# Patient Record
Sex: MALE | Race: BLACK OR AFRICAN AMERICAN | NOT HISPANIC OR LATINO | Employment: OTHER | URBAN - METROPOLITAN AREA
[De-identification: names, ages, dates, MRNs, and addresses within clinical notes are randomized per-mention and may not be internally consistent; named-entity substitution may affect disease eponyms.]

---

## 2017-05-27 ENCOUNTER — HOSPITAL ENCOUNTER (EMERGENCY)
Facility: HOSPITAL | Age: 27
Discharge: HOME | End: 2017-05-28
Attending: EMERGENCY MEDICINE

## 2017-05-27 VITALS
RESPIRATION RATE: 16 BRPM | TEMPERATURE: 98 F | SYSTOLIC BLOOD PRESSURE: 190 MMHG | OXYGEN SATURATION: 98 % | BODY MASS INDEX: 40.43 KG/M2 | DIASTOLIC BLOOD PRESSURE: 90 MMHG | HEART RATE: 91 BPM | HEIGHT: 74 IN | WEIGHT: 315 LBS

## 2017-05-27 DIAGNOSIS — L02.91 ABSCESS: Primary | ICD-10-CM

## 2017-05-27 DIAGNOSIS — L02.214 ABSCESS OF GROIN: ICD-10-CM

## 2017-05-27 PROCEDURE — 10160 PNXR ASPIR ABSC HMTMA BULLA: CPT

## 2017-05-27 PROCEDURE — 10160 PNXR ASPIR ABSC HMTMA BULLA: CPT | Mod: ,,, | Performed by: EMERGENCY MEDICINE

## 2017-05-27 PROCEDURE — 99283 EMERGENCY DEPT VISIT LOW MDM: CPT | Mod: 25,,, | Performed by: EMERGENCY MEDICINE

## 2017-05-27 PROCEDURE — 99283 EMERGENCY DEPT VISIT LOW MDM: CPT | Mod: 25

## 2017-05-27 RX ORDER — ALBUTEROL SULFATE 0.63 MG/3ML
0.63 SOLUTION RESPIRATORY (INHALATION) EVERY 6 HOURS PRN
COMMUNITY

## 2017-05-27 RX ORDER — CITALOPRAM 10 MG/1
10 TABLET ORAL DAILY
COMMUNITY

## 2017-05-28 PROCEDURE — 25000003 PHARM REV CODE 250: Performed by: EMERGENCY MEDICINE

## 2017-05-28 RX ORDER — LIDOCAINE HYDROCHLORIDE 10 MG/ML
5 INJECTION, SOLUTION EPIDURAL; INFILTRATION; INTRACAUDAL; PERINEURAL
Status: COMPLETED | OUTPATIENT
Start: 2017-05-28 | End: 2017-05-28

## 2017-05-28 RX ADMIN — LIDOCAINE HYDROCHLORIDE 50 MG: 10 INJECTION, SOLUTION EPIDURAL; INFILTRATION; INTRACAUDAL; PERINEURAL at 12:05

## 2017-05-28 NOTE — ED TRIAGE NOTES
"Pt states that he had cyst on his groin for several month. Cyst "popped" a couple of days ago. Pt states that he has been experiencing pain from the area. Pt denies any other symptoms.   "

## 2017-05-28 NOTE — ED PROVIDER NOTES
Encounter Date: 5/27/2017       History     Chief Complaint   Patient presents with    Abscess     To right groin     Review of patient's allergies indicates:  No Known Allergies  27 yo M with hx of HTN, asthma presents with small abscess on his upper groin. It started weeks ago and became bigger then spontaneously ruptured. It has since gotten better but in the last 2 days has become painful again. No fevers, chills, skin changes.           Past Medical History:   Diagnosis Date    Asthma     Hypertension      History reviewed. No pertinent surgical history.  History reviewed. No pertinent family history.  Social History   Substance Use Topics    Smoking status: Current Some Day Smoker     Types: Cigarettes    Smokeless tobacco: Not on file    Alcohol use Yes     Review of Systems   Constitutional: Negative for fever.   HENT: Negative for sore throat.    Respiratory: Negative for shortness of breath.    Cardiovascular: Negative for chest pain.   Gastrointestinal: Negative for nausea.   Genitourinary: Negative for dysuria.   Musculoskeletal: Negative for back pain.   Skin: Negative for rash.   Neurological: Negative for weakness.   Hematological: Does not bruise/bleed easily.       Physical Exam     Initial Vitals   BP Pulse Resp Temp SpO2   05/27/17 2328 05/27/17 2328 05/27/17 2328 05/27/17 2328 05/27/17 2350   (!) 190/90 91 16 98.4 °F (36.9 °C) 98 %     Physical Exam    Constitutional: He appears well-nourished. No distress.   obese   HENT:   Head: Normocephalic.   Eyes: Conjunctivae and EOM are normal.   Cardiovascular: Normal rate, regular rhythm, normal heart sounds and intact distal pulses.   No murmur heard.  Pulmonary/Chest: Breath sounds normal. No respiratory distress.   Abdominal: Soft. He exhibits no distension. There is no tenderness.   Musculoskeletal: Normal range of motion. He exhibits no edema or tenderness.   Neurological: He is alert and oriented to person, place, and time.   Skin: Skin is  "warm and dry. Capillary refill takes less than 2 seconds.        1x1 cm firm mobile mass with no surrounding erythema, edema or pain   Psychiatric: He has a normal mood and affect. His behavior is normal. Judgment and thought content normal.         ED Course   I & D - Incision and Drainage  Date/Time: 2017 2:08 AM  Performed by: ANGELICA VILLEGAS  Authorized by: SINDHU ELLISON   Consent Done: Yes  Consent: Verbal consent obtained.  Risks and benefits: risks, benefits and alternatives were discussed  Consent given by: patient  Patient understanding: patient states understanding of the procedure being performed  Patient identity confirmed:  and name  Time out: Immediately prior to procedure a "time out" was called to verify the correct patient, procedure, equipment, support staff and site/side marked as required.  Type: abscess  Body area: anogenital  Location details: abdomen  Anesthesia: local infiltration    Anesthesia:  Anesthesia: local infiltration  Local Anesthetic: lidocaine 1% with epinephrine   Anesthetic total: 2 mL  Patient sedated: no  Scalpel size: 18 guage needle.  Incision type: single straight  Complexity: simple  Drainage: pus and  serosanguinous  Drainage amount: scant  Wound treatment: drainage  Complications: No  Specimens: No  Implants: No  Patient tolerance: Patient tolerated the procedure well with no immediate complications        Labs Reviewed - No data to display                APC / Resident Notes:   26 y.o. male presents with small abscess  No s/s consistent with aggressive cellulitis. Likely a clogged hair follicle  Pt was counseled on BP. Could partly be from pain and discomfort. He follows with  MD in [place].    I&D performed with relief. Will discharge.   [facility]y  Emergency Medicine, PGY-3  2017, 2:05 AM                            ED Course     Clinical Impression:   The encounter diagnosis was Abscess.          Angelica Villegas MD  Resident  17 0210    "

## 2019-11-07 ENCOUNTER — IP HISTORICAL/CONVERTED ENCOUNTER (OUTPATIENT)
Dept: OTHER | Age: 29
End: 2019-11-07

## 2022-07-15 ENCOUNTER — HOSPITAL ENCOUNTER (EMERGENCY)
Age: 32
Discharge: PSYCHIATRIC OR SUBSTANCE USE FACILITY | End: 2022-07-17
Attending: STUDENT IN AN ORGANIZED HEALTH CARE EDUCATION/TRAINING PROGRAM
Payer: MEDICAID

## 2022-07-15 DIAGNOSIS — R45.851 SUICIDAL IDEATION: Primary | ICD-10-CM

## 2022-07-15 PROCEDURE — 80143 DRUG ASSAY ACETAMINOPHEN: CPT

## 2022-07-15 PROCEDURE — 82077 ASSAY SPEC XCP UR&BREATH IA: CPT

## 2022-07-15 PROCEDURE — 87636 SARSCOV2 & INF A&B AMP PRB: CPT

## 2022-07-15 PROCEDURE — 85025 COMPLETE CBC W/AUTO DIFF WBC: CPT

## 2022-07-15 PROCEDURE — 99285 EMERGENCY DEPT VISIT HI MDM: CPT

## 2022-07-15 PROCEDURE — 36415 COLL VENOUS BLD VENIPUNCTURE: CPT

## 2022-07-15 PROCEDURE — 80053 COMPREHEN METABOLIC PANEL: CPT

## 2022-07-15 PROCEDURE — 80179 DRUG ASSAY SALICYLATE: CPT

## 2022-07-16 LAB
ALBUMIN SERPL-MCNC: 3.2 G/DL (ref 3.5–5)
ALBUMIN/GLOB SERPL: 0.8 {RATIO} (ref 1.1–2.2)
ALP SERPL-CCNC: 67 U/L (ref 45–117)
ALT SERPL-CCNC: 66 U/L (ref 12–78)
AMPHET UR QL SCN: POSITIVE
ANION GAP SERPL CALC-SCNC: 4 MMOL/L (ref 5–15)
APAP SERPL-MCNC: <10 UG/ML (ref 10–30)
APPEARANCE UR: CLEAR
AST SERPL W P-5'-P-CCNC: 33 U/L (ref 15–37)
BACTERIA URNS QL MICRO: NEGATIVE /HPF
BARBITURATES UR QL SCN: NEGATIVE
BASOPHILS # BLD: 0 K/UL (ref 0–0.1)
BASOPHILS NFR BLD: 1 % (ref 0–1)
BENZODIAZ UR QL: NEGATIVE
BILIRUB SERPL-MCNC: 0.3 MG/DL (ref 0.2–1)
BILIRUB UR QL: NEGATIVE
BUN SERPL-MCNC: 15 MG/DL (ref 6–20)
BUN/CREAT SERPL: 16 (ref 12–20)
CA-I BLD-MCNC: 8.5 MG/DL (ref 8.5–10.1)
CANNABINOIDS UR QL SCN: NEGATIVE
CHLORIDE SERPL-SCNC: 104 MMOL/L (ref 97–108)
CO2 SERPL-SCNC: 29 MMOL/L (ref 21–32)
COCAINE UR QL SCN: NEGATIVE
COLOR UR: NORMAL
CREAT SERPL-MCNC: 0.93 MG/DL (ref 0.7–1.3)
DATE LAST DOSE: ABNORMAL
DATE LAST DOSE: ABNORMAL
DIFFERENTIAL METHOD BLD: ABNORMAL
DRUG SCRN COMMENT,DRGCM: ABNORMAL
EOSINOPHIL # BLD: 0.1 K/UL (ref 0–0.4)
EOSINOPHIL NFR BLD: 4 % (ref 0–7)
ERYTHROCYTE [DISTWIDTH] IN BLOOD BY AUTOMATED COUNT: 13.4 % (ref 11.5–14.5)
ETHANOL SERPL-MCNC: <10 MG/DL
FLUAV RNA SPEC QL NAA+PROBE: NOT DETECTED
FLUBV RNA SPEC QL NAA+PROBE: NOT DETECTED
GLOBULIN SER CALC-MCNC: 4 G/DL (ref 2–4)
GLUCOSE SERPL-MCNC: 99 MG/DL (ref 65–100)
GLUCOSE UR STRIP.AUTO-MCNC: NEGATIVE MG/DL
HCT VFR BLD AUTO: 35.4 % (ref 36.6–50.3)
HGB BLD-MCNC: 11.9 G/DL (ref 12.1–17)
HGB UR QL STRIP: NEGATIVE
IMM GRANULOCYTES # BLD AUTO: 0 K/UL (ref 0–0.04)
IMM GRANULOCYTES NFR BLD AUTO: 1 % (ref 0–0.5)
KETONES UR QL STRIP.AUTO: NEGATIVE MG/DL
LEUKOCYTE ESTERASE UR QL STRIP.AUTO: NEGATIVE
LYMPHOCYTES # BLD: 2.2 K/UL (ref 0.8–3.5)
LYMPHOCYTES NFR BLD: 56 % (ref 12–49)
MCH RBC QN AUTO: 28 PG (ref 26–34)
MCHC RBC AUTO-ENTMCNC: 33.6 G/DL (ref 30–36.5)
MCV RBC AUTO: 83.3 FL (ref 80–99)
METHADONE UR QL: NEGATIVE
MONOCYTES # BLD: 0.3 K/UL (ref 0–1)
MONOCYTES NFR BLD: 8 % (ref 5–13)
MUCOUS THREADS URNS QL MICRO: NEGATIVE /LPF
NEUTS SEG # BLD: 1.2 K/UL (ref 1.8–8)
NEUTS SEG NFR BLD: 30 % (ref 32–75)
NITRITE UR QL STRIP.AUTO: NEGATIVE
NRBC # BLD: 0 K/UL (ref 0–0.01)
NRBC BLD-RTO: 0 PER 100 WBC
OPIATES UR QL: NEGATIVE
PCP UR QL: NEGATIVE
PH UR STRIP: 5 [PH] (ref 5–8)
PLATELET # BLD AUTO: 285 K/UL (ref 150–400)
PMV BLD AUTO: 9.5 FL (ref 8.9–12.9)
POTASSIUM SERPL-SCNC: 3.6 MMOL/L (ref 3.5–5.1)
PROT SERPL-MCNC: 7.2 G/DL (ref 6.4–8.2)
PROT UR STRIP-MCNC: NEGATIVE MG/DL
RBC # BLD AUTO: 4.25 M/UL (ref 4.1–5.7)
RBC #/AREA URNS HPF: NORMAL /HPF (ref 0–5)
REPORTED DOSE,DOSE: ABNORMAL UNITS
REPORTED DOSE,DOSE: ABNORMAL UNITS
SALICYLATES SERPL-MCNC: <1.7 MG/DL (ref 2.8–20)
SARS-COV-2, COV2: NOT DETECTED
SODIUM SERPL-SCNC: 137 MMOL/L (ref 136–145)
SP GR UR REFRACTOMETRY: 1.01 (ref 1–1.03)
UROBILINOGEN UR QL STRIP.AUTO: 0.1 EU/DL (ref 0.1–1)
WBC # BLD AUTO: 3.9 K/UL (ref 4.1–11.1)
WBC URNS QL MICRO: NORMAL /HPF (ref 0–4)

## 2022-07-16 PROCEDURE — 74011250637 HC RX REV CODE- 250/637: Performed by: PSYCHIATRY & NEUROLOGY

## 2022-07-16 PROCEDURE — 74011250637 HC RX REV CODE- 250/637: Performed by: STUDENT IN AN ORGANIZED HEALTH CARE EDUCATION/TRAINING PROGRAM

## 2022-07-16 PROCEDURE — 81001 URINALYSIS AUTO W/SCOPE: CPT

## 2022-07-16 PROCEDURE — 80307 DRUG TEST PRSMV CHEM ANLYZR: CPT

## 2022-07-16 RX ORDER — PRAZOSIN HYDROCHLORIDE 1 MG/1
2 CAPSULE ORAL ONCE
Status: COMPLETED | OUTPATIENT
Start: 2022-07-16 | End: 2022-07-16

## 2022-07-16 RX ORDER — QUETIAPINE FUMARATE 100 MG/1
200 TABLET, FILM COATED ORAL
Status: DISCONTINUED | OUTPATIENT
Start: 2022-07-16 | End: 2022-07-17 | Stop reason: HOSPADM

## 2022-07-16 RX ORDER — SERTRALINE HYDROCHLORIDE 25 MG/1
50 TABLET, FILM COATED ORAL DAILY
Status: DISCONTINUED | OUTPATIENT
Start: 2022-07-16 | End: 2022-07-17 | Stop reason: HOSPADM

## 2022-07-16 RX ORDER — QUETIAPINE FUMARATE 25 MG/1
50 TABLET, FILM COATED ORAL DAILY
Status: DISCONTINUED | OUTPATIENT
Start: 2022-07-16 | End: 2022-07-17 | Stop reason: HOSPADM

## 2022-07-16 RX ADMIN — QUETIAPINE FUMARATE 50 MG: 25 TABLET ORAL at 12:50

## 2022-07-16 RX ADMIN — QUETIAPINE FUMARATE 200 MG: 100 TABLET ORAL at 23:41

## 2022-07-16 RX ADMIN — PRAZOSIN HYDROCHLORIDE 2 MG: 1 CAPSULE ORAL at 23:42

## 2022-07-16 RX ADMIN — SERTRALINE 50 MG: 25 TABLET, FILM COATED ORAL at 12:50

## 2022-07-16 NOTE — ED NOTES
Patient resting in position of comfort. No needs expressed at this time.  Vitals within normal limits
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
## 2022-07-16 NOTE — BSMART NOTE
BSMART UPDATE    Amira from [facility] called and reported that pt has been denied at all [facility] [employer]. Bed Search to continue with outside hospitals.

## 2022-07-16 NOTE — ED NOTES
Amira with [facility] advised we needed to place consult to psychiatry as they are having difficulty getting Pt placed. She is hopeful Dr. Linda Young will just admit Pt here. Placed stat consult for Nael.

## 2022-07-16 NOTE — ED NOTES
Bedside and Verbal shift change report given to Moises Olszewski, RN (oncoming nurse) by Grace Barrera RN (offgoing nurse). Report included the following information SBAR, ED Summary, MAR and Recent Results.

## 2022-07-16 NOTE — BSMART NOTE
BSMART Liaison Team Note     LOS:  11:38     Patient goal(s) for today: take medications as prescribed, use coping skills and make requests be known to ED staff in a respectful manner. BSMART Liaison team focus/goals: Provide Support    Progress note: Pt was seen by this writer this morning in ED room 19. He was alert and oriented x4. Speech is normal and demonstrated good eye contact. Range of affect is full and pt was calm. Pt's thought process, content and perception all appeared normal. Continues to have suicidal thinking. Denied HI and AVH at this time. Pt was pleasant and cooperative. He was receptive towards discussion regarding coping skills and reported being respectful to ED staff and nurses. Pt continues to want help for suicidal thinking. Barriers to Discharge: Suicidal thoughts    Outpatient provider(s): None   Insurance info/prescription coverage:  [facility] [place]    Diagnosis: MDD and PTSD    Plan: To provide Support until a bed is located for appropriate treatment. BSMART and BSMART Liaison will continue to assess daily. Follow up Psych Consult placed? Yes  Psychiatrist updated?  Yes - By [facility]      Participating treatment team members: Shanna Kang and Blake Barber, [facility], [address] [place] [address]

## 2022-07-16 NOTE — ED NOTES
Received report & assumed care of pt from previous RN, Oscar tSeele. Pt resting quietly in bed, eyes closed. Respirations regular, unlabored. Pt remains in psych safe room with sitter present.

## 2022-07-16 NOTE — BSMART NOTE
BSMART assessment completed, and suicide risk level noted to be moderate. Physician Dr Kami Adams notified. Concerns not observed. Security/Off- has not been notified.
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
## 2022-07-16 NOTE — ED NOTES
Constant Observer Yes - Name: Duc Acuña   Constant Observer Oriented YES   High risk patients are in line of sight at all times yes   Excess equipment/medical supplies not necessary for thecare of the patient removed yes   All sharp or dangerous objects are removed from room: including but not limited to belts, pens & pencils, needles, medications, cosmetics, lighters, matches, nail files, watches, necklaces, glass objects, razors, razor blades, knives, aerosol sprays, drawstring pants, shoes, cords (telephone, call bells, etc.) cleaning wipes or other cleaning items, aluminum cans, not permanently attached wall décor yes   Telephone/cell phone removed as well as TV remote (batteries can be swallowed) yes   Patient belongings removed and labeled at nurses station yes   yest yes   All plastic bags are removed from the room and replaced with paper trash bags yes   Patient is in paper scrubs or appropriate gown and using hospital socks with rubber soles yes   No metal, hard eating utensils or hard plates are on meal tray yes    yes   If Crucifix is hanging on a nail, remove Crucifix as well as the nail Yes         *If any question above is answered \"No,\" documentation is required.

## 2022-07-17 ENCOUNTER — APPOINTMENT (OUTPATIENT)
Dept: GENERAL RADIOLOGY | Age: 32
DRG: 751 | End: 2022-07-17
Attending: INTERNAL MEDICINE
Payer: MEDICAID

## 2022-07-17 ENCOUNTER — HOSPITAL ENCOUNTER (INPATIENT)
Age: 32
LOS: 11 days | Discharge: HOME | DRG: 751 | End: 2022-07-28
Attending: PSYCHIATRY & NEUROLOGY | Admitting: PSYCHIATRY & NEUROLOGY
Payer: MEDICAID

## 2022-07-17 VITALS
RESPIRATION RATE: 15 BRPM | OXYGEN SATURATION: 99 % | BODY MASS INDEX: 34.8 KG/M2 | HEART RATE: 54 BPM | DIASTOLIC BLOOD PRESSURE: 87 MMHG | SYSTOLIC BLOOD PRESSURE: 131 MMHG | HEIGHT: 74 IN | TEMPERATURE: 98.8 F | WEIGHT: 271.17 LBS

## 2022-07-17 DIAGNOSIS — R45.851 SUICIDAL THOUGHTS: ICD-10-CM

## 2022-07-17 DIAGNOSIS — Z02.89 SELF-REFERRAL: ICD-10-CM

## 2022-07-17 DIAGNOSIS — F41.9 ANXIETY: Primary | ICD-10-CM

## 2022-07-17 DIAGNOSIS — R52 PAIN: ICD-10-CM

## 2022-07-17 DIAGNOSIS — F32.0 CURRENT MILD EPISODE OF MAJOR DEPRESSIVE DISORDER WITHOUT PRIOR EPISODE (HCC): ICD-10-CM

## 2022-07-17 DIAGNOSIS — F43.10 PTSD (POST-TRAUMATIC STRESS DISORDER): ICD-10-CM

## 2022-07-17 PROBLEM — F32.A DEPRESSION: Status: ACTIVE | Noted: 2022-07-17

## 2022-07-17 PROCEDURE — 73552 X-RAY EXAM OF FEMUR 2/>: CPT

## 2022-07-17 PROCEDURE — 74011250637 HC RX REV CODE- 250/637: Performed by: PSYCHIATRY & NEUROLOGY

## 2022-07-17 PROCEDURE — 65220000003 HC RM SEMIPRIVATE PSYCH

## 2022-07-17 RX ORDER — LANOLIN ALCOHOL/MO/W.PET/CERES
1 CREAM (GRAM) TOPICAL
Status: DISCONTINUED | OUTPATIENT
Start: 2022-07-18 | End: 2022-07-28 | Stop reason: HOSPADM

## 2022-07-17 RX ORDER — DIPHENHYDRAMINE HYDROCHLORIDE 50 MG/ML
50 INJECTION, SOLUTION INTRAMUSCULAR; INTRAVENOUS
Status: DISCONTINUED | OUTPATIENT
Start: 2022-07-17 | End: 2022-07-28 | Stop reason: HOSPADM

## 2022-07-17 RX ORDER — QUETIAPINE 150 MG/1
150 TABLET, FILM COATED, EXTENDED RELEASE ORAL
COMMUNITY
End: 2022-07-28

## 2022-07-17 RX ORDER — QUETIAPINE FUMARATE 50 MG/1
50 TABLET, FILM COATED ORAL DAILY
Status: DISCONTINUED | OUTPATIENT
Start: 2022-07-18 | End: 2022-07-28 | Stop reason: HOSPADM

## 2022-07-17 RX ORDER — ADHESIVE BANDAGE
30 BANDAGE TOPICAL DAILY PRN
Status: DISCONTINUED | OUTPATIENT
Start: 2022-07-17 | End: 2022-07-28 | Stop reason: HOSPADM

## 2022-07-17 RX ORDER — TRAZODONE HYDROCHLORIDE 50 MG/1
50 TABLET ORAL
Status: DISCONTINUED | OUTPATIENT
Start: 2022-07-17 | End: 2022-07-28 | Stop reason: HOSPADM

## 2022-07-17 RX ORDER — ESCITALOPRAM OXALATE 10 MG/1
10 TABLET ORAL DAILY
COMMUNITY
End: 2022-07-28

## 2022-07-17 RX ORDER — TRAMADOL HYDROCHLORIDE 50 MG/1
50 TABLET ORAL
Status: ON HOLD | COMMUNITY
End: 2022-07-28 | Stop reason: SDUPTHER

## 2022-07-17 RX ORDER — BENZTROPINE MESYLATE 1 MG/1
1 TABLET ORAL
Status: DISCONTINUED | OUTPATIENT
Start: 2022-07-17 | End: 2022-07-28 | Stop reason: HOSPADM

## 2022-07-17 RX ORDER — OLANZAPINE 2.5 MG/1
5 TABLET ORAL
Status: DISCONTINUED | OUTPATIENT
Start: 2022-07-17 | End: 2022-07-20

## 2022-07-17 RX ORDER — ESCITALOPRAM OXALATE 10 MG/1
10 TABLET ORAL DAILY
Status: DISCONTINUED | OUTPATIENT
Start: 2022-07-18 | End: 2022-07-17

## 2022-07-17 RX ORDER — ACETAMINOPHEN 325 MG/1
650 TABLET ORAL
Status: DISCONTINUED | OUTPATIENT
Start: 2022-07-17 | End: 2022-07-28 | Stop reason: HOSPADM

## 2022-07-17 RX ORDER — TRAMADOL HYDROCHLORIDE 50 MG/1
50 TABLET ORAL
Status: DISCONTINUED | OUTPATIENT
Start: 2022-07-17 | End: 2022-07-28 | Stop reason: HOSPADM

## 2022-07-17 RX ORDER — HYDROXYZINE 50 MG/1
50 TABLET, FILM COATED ORAL
Status: DISCONTINUED | OUTPATIENT
Start: 2022-07-17 | End: 2022-07-28 | Stop reason: HOSPADM

## 2022-07-17 RX ORDER — QUETIAPINE FUMARATE 50 MG/1
50 TABLET, FILM COATED ORAL DAILY
COMMUNITY
End: 2022-07-28

## 2022-07-17 RX ORDER — PRAZOSIN HYDROCHLORIDE 1 MG/1
1 CAPSULE ORAL
COMMUNITY
End: 2022-07-28

## 2022-07-17 RX ORDER — LANOLIN ALCOHOL/MO/W.PET/CERES
325 CREAM (GRAM) TOPICAL
COMMUNITY

## 2022-07-17 RX ORDER — PRAZOSIN HYDROCHLORIDE 1 MG/1
1 CAPSULE ORAL
Status: DISCONTINUED | OUTPATIENT
Start: 2022-07-17 | End: 2022-07-28 | Stop reason: HOSPADM

## 2022-07-17 RX ADMIN — TRAMADOL HYDROCHLORIDE 50 MG: 50 TABLET, COATED ORAL at 15:26

## 2022-07-17 RX ADMIN — SERTRALINE 50 MG: 25 TABLET, FILM COATED ORAL at 10:06

## 2022-07-17 RX ADMIN — PRAZOSIN HYDROCHLORIDE 1 MG: 1 CAPSULE ORAL at 20:17

## 2022-07-17 RX ADMIN — QUETIAPINE FUMARATE 50 MG: 25 TABLET ORAL at 10:06

## 2022-07-17 RX ADMIN — QUETIAPINE FUMARATE 150 MG: 100 TABLET ORAL at 20:16

## 2022-07-17 RX ADMIN — TRAMADOL HYDROCHLORIDE 50 MG: 50 TABLET, COATED ORAL at 20:29

## 2022-07-17 NOTE — ED NOTES
Report given to Danelle Calderon RN at [facility]. Report given to [facility] for transportation. Belongings given back to patient, escorted to [facility]  bus by wheelchair.

## 2022-07-17 NOTE — BH NOTES
Pt. Complaining of moderate pain in right inner thigh area, PRN tramadol administered per request.  Pain stated number was 6.

## 2022-07-17 NOTE — ED NOTES
Assumed care of patient at this time. Pt resting on stretcher, in no acute distress. Walker at bedside. 1:1 sitter at bedside and able to directly visualize patient. [id number]: pt given breakfast tray. 1:1 sitter still at bedside.
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
## 2022-07-17 NOTE — ED NOTES
Report given to Hugo moore RN. Pending bed search at this time. Pt is resting comfortably in no acute distress. Sitter at bedside. Transferring care of patient at this time.
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
## 2022-07-17 NOTE — BSMART NOTE
ADULT VOLUNTARY BED SEARCH STARTED/RESUMED AT 7/17/2022:     [facility] ([place] [address] [address], [facility]): contacted at 0800 spoke with Amira* reported reviewing    1100AM pt accepted to [facility], see notes for details     [facility]: contacted at 26 spoke with Hilda Zhang* reported fax [phone] declined, inappropriate      [facility] ([facility] [address]  [facility]): contacted at 1524 spoke with Louise Logan* [facility] contacted at 46 spoke with Mojgan Diallo reported will call this writer back     Tiffanie Lamb: contacted at 65 spoke with Glenna Rodriguez* reported at [address]: contacted at 65 spoke with Michell Franz* reported fax clinicals     [address]: contacted at 46 spoke with Kirsten Garrido* reported at [facility]: contacted at 46 spoke with Sofía Berman* reported at [address]: contacted at 18 spoke with Demetrius Johnson* reported at [address]: contacted at 18, left  VM* reported fax clinicals     [facility] (Crissie Hemp): contacted at [address] [facility] spoke with Monica Ba* reported at [address]: contacted at [facility] spoke with Lindsey Lara* reported at [address]: contacted at 0806, left VM     [address]: contacted at [address] [facility] spoke with Adelaida Tyler* reported at [address]: contacted at [address] [facility] spoke with Massiel Pierce* reported at [address] ([address]): contacted at [phone] spoke with Maggie Forrester reported fax [phone] declined due to past/current legal charges        Caprice Mccarthy RN

## 2022-07-17 NOTE — ED NOTES
Patient stable. No acute complaints. Evaluation was normal, the patient is resting comfortably. Vital signs are normal.  Pending bed search.

## 2022-07-18 LAB
EST. AVERAGE GLUCOSE BLD GHB EST-MCNC: 88 MG/DL
HBA1C MFR BLD: 4.7 % (ref 4–5.6)

## 2022-07-18 PROCEDURE — 36415 COLL VENOUS BLD VENIPUNCTURE: CPT

## 2022-07-18 PROCEDURE — 65220000003 HC RM SEMIPRIVATE PSYCH

## 2022-07-18 PROCEDURE — 74011250637 HC RX REV CODE- 250/637: Performed by: PSYCHIATRY & NEUROLOGY

## 2022-07-18 PROCEDURE — 83036 HEMOGLOBIN GLYCOSYLATED A1C: CPT

## 2022-07-18 RX ORDER — SERTRALINE HYDROCHLORIDE 50 MG/1
50 TABLET, FILM COATED ORAL DAILY
Status: DISCONTINUED | OUTPATIENT
Start: 2022-07-18 | End: 2022-07-28 | Stop reason: HOSPADM

## 2022-07-18 RX ADMIN — TRAZODONE HYDROCHLORIDE 50 MG: 50 TABLET ORAL at 21:49

## 2022-07-18 RX ADMIN — ACETAMINOPHEN 650 MG: 325 TABLET ORAL at 16:48

## 2022-07-18 RX ADMIN — SERTRALINE HYDROCHLORIDE 50 MG: 50 TABLET ORAL at 15:08

## 2022-07-18 RX ADMIN — QUETIAPINE FUMARATE 50 MG: 50 TABLET ORAL at 08:06

## 2022-07-18 RX ADMIN — QUETIAPINE FUMARATE 150 MG: 100 TABLET ORAL at 21:49

## 2022-07-18 RX ADMIN — TRAMADOL HYDROCHLORIDE 50 MG: 50 TABLET, COATED ORAL at 08:06

## 2022-07-18 RX ADMIN — TRAMADOL HYDROCHLORIDE 50 MG: 50 TABLET, COATED ORAL at 21:50

## 2022-07-18 RX ADMIN — FERROUS SULFATE TAB 325 MG (65 MG ELEMENTAL FE) 325 MG: 325 (65 FE) TAB at 08:07

## 2022-07-18 RX ADMIN — PRAZOSIN HYDROCHLORIDE 1 MG: 1 CAPSULE ORAL at 21:50

## 2022-07-18 NOTE — PROGRESS NOTES
Problem: Falls - Risk of  Goal: *Absence of Falls  Description: Document Ravi Coles Fall Risk and appropriate interventions in the flowsheet.   Outcome: Progressing Towards Goal  Note: Fall Risk Interventions:  Mobility Interventions: Utilize walker, cane, or other assistive device         Medication Interventions: Teach patient to arise slowly
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
## 2022-07-18 NOTE — GROUP NOTE
MICHAEL  GROUP DOCUMENTATION INDIVIDUAL                                                                          Group Therapy Note    Date: 7/18/2022    Group Start Time: 1340  Group End Time: 1500  Group Topic: Process Group - Inpatient    [facility] Renae Murray    IP [address] [facility] GROUP DOCUMENTATION GROUP    Group Therapy Note    Attendees: 2/3    Writer facilitated a combination psych-education/process group. Writer provided education along with a worksheet titled \"Building New Habits. \" Writer encouraged patients to provide input and feedback about the worksheet. Writer discussed various aspects of building new habits and encouraged patients to process ways they could incorporate these changes into their lives. Writer then facilitated a processing group. Writer encouraged patients to process any feelings they may be having and to discuss discharge plans. Writer provided patients with a mindfulness expressive arts exercise involving inder while they were processing. Writer encouraged patients to provide feedback regarding the group. Attendance: Did not attend      Additional Notes:  Pt was invited and encouraged to attend group but chose not to attend.      [facility]

## 2022-07-18 NOTE — BH NOTES
Pt has spent a couple hours this afternoon in the dayroom, watching a movie. No s/s of distress noted. Pt came to the nurses station asking for some tylenol for pain. Pt is ambulating with the walker w/o difficulties. When patient was brought his prn tylenol, he asked to change his dressing again this evening. Dressing currently has sanguinous drainage visible.   Pt requested to eat first.

## 2022-07-18 NOTE — BH NOTES
Pt slept overnight ,    Pt sleeping at this time breathing spontaneously and changing positions while asleep. Lori Christine  No violent no self harming behaviors noticed  or reported

## 2022-07-19 PROCEDURE — 74011250637 HC RX REV CODE- 250/637: Performed by: PSYCHIATRY & NEUROLOGY

## 2022-07-19 PROCEDURE — 65220000003 HC RM SEMIPRIVATE PSYCH

## 2022-07-19 RX ADMIN — QUETIAPINE FUMARATE 150 MG: 100 TABLET ORAL at 20:41

## 2022-07-19 RX ADMIN — SERTRALINE HYDROCHLORIDE 50 MG: 50 TABLET ORAL at 08:24

## 2022-07-19 RX ADMIN — FERROUS SULFATE TAB 325 MG (65 MG ELEMENTAL FE) 325 MG: 325 (65 FE) TAB at 08:24

## 2022-07-19 RX ADMIN — TRAZODONE HYDROCHLORIDE 50 MG: 50 TABLET ORAL at 20:42

## 2022-07-19 RX ADMIN — TRAMADOL HYDROCHLORIDE 50 MG: 50 TABLET, COATED ORAL at 17:23

## 2022-07-19 RX ADMIN — QUETIAPINE FUMARATE 50 MG: 50 TABLET ORAL at 08:24

## 2022-07-19 RX ADMIN — PRAZOSIN HYDROCHLORIDE 1 MG: 1 CAPSULE ORAL at 20:41

## 2022-07-19 RX ADMIN — TRAMADOL HYDROCHLORIDE 50 MG: 50 TABLET, COATED ORAL at 12:16

## 2022-07-19 NOTE — BH NOTES
PSA PART II ADDITIONAL INFORMATION        Access To Fire Arms: No    Substance Use: YES    Last Use: Pt states \"few months ago\" However, labs were positive for amphetamines     Type of Substance: Other amphetamines     Frequency of Use: Pt reports 'not frequently. \"    Request to See : NO    If yes, notified : NO    Guardian:NO    Guardian Contact: N/A    Release of Information Signed: YES    Release of Information Signed For: Samaria Licea, [phone]    Patient signed treatment plan. Collateral: Writer called and left a message for Samaria Licea.

## 2022-07-19 NOTE — PROGRESS NOTES
Pt is progressing toward his goals. Denies suicidal and homicidal thoughts, says his depression and anxiety is better. Attending and participating in groups, compliant with meds. Pleasant and cooperative with staff and peers. Safe on unit.
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
## 2022-07-19 NOTE — PROGRESS NOTES
Problem: Depressed Mood (Adult/Pediatric)  Goal: *STG: Participates in treatment plan  Outcome: Progressing Towards Goal  Goal: *STG: Attends activities and groups  Outcome: Progressing Towards Goal  Goal: *LTG: Returns to previous level of functioning and participates with after care plan  Outcome: Progressing Towards Goal  Note: Pt engaged in PSA.  Pt stated that he is open to inpatient substance use program.
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
## 2022-07-19 NOTE — PROGRESS NOTES
Problem: Anxiety  Goal: *Alleviation of anxiety  7/18/2022 2242 by Malena Amaral RN  Outcome: Progressing Towards Goal  7/18/2022 2239 by Malena Amaral RN  Outcome: Progressing Towards Goal     Problem: Depressed Mood (Adult/Pediatric)  Goal: *STG: Participates in treatment plan  Outcome: Progressing Towards Goal  Goal: *STG: Attends activities and groups  Outcome: Progressing Towards Goal  Goal: *STG: Remains safe in hospital  Outcome: Progressing Towards Goal  Goal: *STG: Complies with medication therapy  Outcome: Progressing Towards Goal     Problem: Suicide  Goal: *STG: Remains safe in hospital  Outcome: Progressing Towards Goal

## 2022-07-19 NOTE — GROUP NOTE
MICHAEL  GROUP DOCUMENTATION INDIVIDUAL                                                                          Group Therapy Note    Date: 7/19/2022    Group Start Time: 1330  Group End Time: 1500  Group Topic: Process Group - Inpatient    [facility] Renae Murray    IP [address] [facility] GROUP DOCUMENTATION GROUP    Group Therapy Note    Attendees: 3/3    Writer facilitated a combination reflection/relaxation and process group. Writer encouraged patients to process any feelings they may be having and encouraged them to discuss coping skills and discharge plans. Writer reviewed with patients various concepts such as \"toxic positivity\" and encouraged them to reflect. Writer had patients engage in an expressive arts therapeutic activity involving paint for the reflection/relaxation piece. Writer concluded group with a grounding exercise and encouraged patients to provide input and feedback regarding the group. Attendance: Attended    Patient's Goal:  To attend and participate in groups and activities daily    Interventions/techniques: Art integration, Informed, Validated, Promoted peer support and Supported    Follows Directions: Followed directions    Interactions: Interacted appropriately    Mental Status: Congruent and Elevated    Behavior/appearance: Attentive, Caretaking, Cooperative, Enthusiastic and Motivated    Goals Achieved: Able to engage in interactions, Able to listen to others, Able to give feedback to another, Able to reflect/comment on own behavior, Able to manage/cope with feelings, Able to self-disclose, Discussed coping, Discussed discharge plans, Displayed empathy and Identified relapse prevention strategies      Additional Notes:  Pt actively participated. Pt was able to engage in the expressive arts activity and reflect on the meaning.  Pt was able to discuss his struggles with anxiety and depression since his injury and stated that he wanted to make positive changes and become a . Pt is making progress by attending and participating in groups and activities daily.      [facility]

## 2022-07-19 NOTE — BH NOTES
Pt. Complained of pain 7/10 in right thigh area. Requested pain medication. PRN tramadol administered.
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
## 2022-07-20 PROCEDURE — 74011250637 HC RX REV CODE- 250/637: Performed by: PSYCHIATRY & NEUROLOGY

## 2022-07-20 PROCEDURE — 74011250637 HC RX REV CODE- 250/637: Performed by: INTERNAL MEDICINE

## 2022-07-20 PROCEDURE — 65220000003 HC RM SEMIPRIVATE PSYCH

## 2022-07-20 RX ORDER — METHOCARBAMOL 500 MG/1
500 TABLET, FILM COATED ORAL 3 TIMES DAILY
Status: DISCONTINUED | OUTPATIENT
Start: 2022-07-20 | End: 2022-07-28 | Stop reason: HOSPADM

## 2022-07-20 RX ORDER — OLANZAPINE 5 MG/1
5 TABLET, ORALLY DISINTEGRATING ORAL
Status: DISCONTINUED | OUTPATIENT
Start: 2022-07-20 | End: 2022-07-24

## 2022-07-20 RX ADMIN — TRAMADOL HYDROCHLORIDE 50 MG: 50 TABLET, COATED ORAL at 13:52

## 2022-07-20 RX ADMIN — FERROUS SULFATE TAB 325 MG (65 MG ELEMENTAL FE) 325 MG: 325 (65 FE) TAB at 08:39

## 2022-07-20 RX ADMIN — TRAMADOL HYDROCHLORIDE 50 MG: 50 TABLET, COATED ORAL at 05:25

## 2022-07-20 RX ADMIN — METHOCARBAMOL 500 MG: 500 TABLET ORAL at 16:19

## 2022-07-20 RX ADMIN — PRAZOSIN HYDROCHLORIDE 1 MG: 1 CAPSULE ORAL at 21:13

## 2022-07-20 RX ADMIN — ACETAMINOPHEN 650 MG: 325 TABLET ORAL at 08:49

## 2022-07-20 RX ADMIN — SERTRALINE HYDROCHLORIDE 50 MG: 50 TABLET ORAL at 08:39

## 2022-07-20 RX ADMIN — QUETIAPINE FUMARATE 50 MG: 50 TABLET ORAL at 08:39

## 2022-07-20 RX ADMIN — METHOCARBAMOL 500 MG: 500 TABLET ORAL at 21:13

## 2022-07-20 RX ADMIN — QUETIAPINE FUMARATE 150 MG: 100 TABLET ORAL at 21:13

## 2022-07-20 NOTE — PROGRESS NOTES
Spiritual Care Assessment/Progress Note  [facility]      NAME: Jazz Guy      MRN: [id number]  AGE: 28 y.o.  SEX: male  Hoahaoism Affiliation: No preference   Language: English     7/20/2022     Total Time (in minutes): 15     Spiritual Assessment begun in [facility] through conversation with:         [x]Patient        [] Family    [] Friend(s)        Reason for Consult: Initial/Spiritual assessment, patient floor     Spiritual beliefs: (Please include comment if needed)     [x] Identifies with a priya tradition:  Mor Riggs       [] Supported by a priya community:            [] Claims no spiritual orientation:           [] Seeking spiritual identity:                [] Adheres to an individual form of spirituality:           [] Not able to assess:                           Identified resources for coping:      [] Prayer                               [] Music                  [] Guided Imagery     [x] Family/friends                 [] Pet visits     [] Devotional reading                         [] Unknown     [] Other:                                               Interventions offered during this visit: (See comments for more details)    Patient Interventions: Affirmation of emotions/emotional suffering, Affirmation of priya, Hoahaoism beliefs/image of God discussed, Iconic (affirming the presence of God/Higher Power)           Plan of Care:     [x] Support spiritual and/or cultural needs    [] Support AMD and/or advance care planning process      [] Support grieving process   [] Coordinate Rites and/or Rituals    [] Coordination with community clergy   [] No spiritual needs identified at this time   [] Detailed Plan of Care below (See Comments)  [] Make referral to Music Therapy  [] Make referral to Pet Therapy     [] Make referral to Addiction services  [] Make referral to [facility]  [] Make referral to Spiritual Care Partner  [] No future visits requested [x] Contact Spiritual Care for further referrals     Comments: Visited with patient while rounding in the [facility] Unit. Patient shared that he is [place] and had a Bible close by that he was reading. Patient has support at home. Patient wasn't feeling that good stating that his legs were hurting.  provided words of affirmation, care and concern, and presence. Advised of  availability. Reviewed chart. Discussed case with staff. Rev.  Gianni Sullivan 68, Cathleen Gustafson

## 2022-07-20 NOTE — BH NOTES
B:  Received awake up in room. Walks with walker with balance stability. Reports resting well during the night. Pt. Alert and oriented x 4. Pt. States depression is a 2. Pt. States anxiety is 2. Pt. denies hallucinations. Denies SI/HI. Cooperative with assessment. Self dressing change to right thigh wound. Cleaned with wound , packed with iodoform,  2x2 sterile gauge applied, Border foam placed on top. Performed procedure well. Small amount of bloody drainage noted to old dressing. I:   Administer medications as ordered and needed, Encourage pt to attend and participate in groups, encourage pt to be up for all meals and snacks, consuming all each time, encourage pt to interact with peers in a positive manner. Q 15 minute safety checks continue. R:   Compliant with medications. does attend groups, does participate. Pt. is getting up for meals, consumes 100% of meals, snacks. Pt. does interact with peers. no safety concerns at this time. P:   Pt. Will develop and continue to utilize positive coping skills. Plan of care will continue as ordered. Will continue to interact with peers and staff appropriately.

## 2022-07-21 PROCEDURE — 74011250637 HC RX REV CODE- 250/637: Performed by: INTERNAL MEDICINE

## 2022-07-21 PROCEDURE — 74011250637 HC RX REV CODE- 250/637: Performed by: PSYCHIATRY & NEUROLOGY

## 2022-07-21 PROCEDURE — 65220000003 HC RM SEMIPRIVATE PSYCH

## 2022-07-21 RX ORDER — ATOMOXETINE 40 MG/1
40 CAPSULE ORAL
Status: DISCONTINUED | OUTPATIENT
Start: 2022-07-22 | End: 2022-07-21

## 2022-07-21 RX ADMIN — TRAMADOL HYDROCHLORIDE 50 MG: 50 TABLET, COATED ORAL at 07:52

## 2022-07-21 RX ADMIN — QUETIAPINE FUMARATE 150 MG: 100 TABLET ORAL at 21:31

## 2022-07-21 RX ADMIN — QUETIAPINE FUMARATE 50 MG: 50 TABLET ORAL at 08:09

## 2022-07-21 RX ADMIN — TRAMADOL HYDROCHLORIDE 50 MG: 50 TABLET, COATED ORAL at 21:46

## 2022-07-21 RX ADMIN — HYDROXYZINE HYDROCHLORIDE 50 MG: 50 TABLET, FILM COATED ORAL at 15:32

## 2022-07-21 RX ADMIN — PRAZOSIN HYDROCHLORIDE 1 MG: 1 CAPSULE ORAL at 21:31

## 2022-07-21 RX ADMIN — METHOCARBAMOL 500 MG: 500 TABLET ORAL at 08:09

## 2022-07-21 RX ADMIN — METHOCARBAMOL 500 MG: 500 TABLET ORAL at 15:32

## 2022-07-21 RX ADMIN — METHOCARBAMOL 500 MG: 500 TABLET ORAL at 21:31

## 2022-07-21 RX ADMIN — HYDROXYZINE HYDROCHLORIDE 50 MG: 50 TABLET, FILM COATED ORAL at 04:20

## 2022-07-21 RX ADMIN — SERTRALINE HYDROCHLORIDE 50 MG: 50 TABLET ORAL at 08:09

## 2022-07-21 RX ADMIN — FERROUS SULFATE TAB 325 MG (65 MG ELEMENTAL FE) 325 MG: 325 (65 FE) TAB at 07:50

## 2022-07-21 NOTE — GROUP NOTE
MICHAEL  GROUP DOCUMENTATION INDIVIDUAL                                                                          Group Therapy Note    Date: 7/21/2022    Group Start Time: 1600  Group End Time: 1700  Group Topic: Reflection/Relaxation    [facility]    Amaya Renae    IP [address] [facility] GROUP DOCUMENTATION GROUP    Group Therapy Note    Attendees: 4/5    Writer facilitated a mindfulness reflection/relaxation group. Writer incorporated music and leisure packet with the use of manadalas as a therapeutic tool. Writer encouraged patients to provide feedback regarding the group. Attendance: Attended    Patient's Goal:  To attend and participate in groups and activities daily     Interventions/techniques: Art integration and Other Reflection/Relaxation    Follows Directions: Followed directions    Interactions: Interacted appropriately    Mental Status: Calm and Congruent    Behavior/appearance: Caretaking and Cooperative    Goals Achieved: Able to listen to others, Able to reflect/comment on own behavior, and Able to experience relief/decrease in symptoms      Additional Notes:  Pt actively participated in reflection/relaxation. Pt is making progress by attending and participating in groups and activities daily.      [facility]

## 2022-07-21 NOTE — GROUP NOTE
MICHAEL  GROUP DOCUMENTATION INDIVIDUAL                                                                          Group Therapy Note    Date: 7/21/2022    Group Start Time: 4919  Group End Time: 1600  Group Topic: Process Group - Inpatient    [facility] Renae Murray    IP 1150 [facility] GROUP DOCUMENTATION GROUP    Group Therapy Note    Attendees: 5/5    Writer facilitated a processing group. Writer encouraged patients to process any feelings they may be having and to discuss discharge plans. Writer processed with patients about emotional regulations skills. Writer concluded session by encouraged patients to engage in feedback regarding the group. Attendance: Attended    Patient's Goal:  To attend and participate in groups and activities daily    Interventions/techniques: Informed, Validated, Promoted peer support, and Supported    Follows Directions: Followed directions    Interactions: Interacted appropriately    Mental Status: Congruent and Manic    Behavior/appearance: Attentive, Caretaking, Cooperative, Motivated, and Needed prompting    Goals Achieved: Able to engage in interactions, Able to listen to others, Able to give feedback to another, Able to manage/cope with feelings, Able to receive feedback, Able to self-disclose, and Discussed discharge plans      Additional Notes:  Pt was elevated at the beginning of group but was able to be re-directed. Pt was able to discuss some feelings regarding discharge plans and discussed feelings related to being denied at one of the treatment centers after assessment. Pt is making progress by attending and participating in groups and activities daily.      Adama Antonio

## 2022-07-21 NOTE — BH NOTES
Collateral: Writer followed up with [employer] [facility]. Admissions team stated that pt was on a wait list and that they would call if an assessment could be completed sooner. Next appointment available is Thursday, July 28th at 1:30 pm.    Collateral: Writer followed up with [facility]. Admissions team stated that pt's paperwork had not been reviewed yet but that pt could complete a pre-screen intake assessment at [phone].

## 2022-07-21 NOTE — PROGRESS NOTES
Problem: Falls - Risk of  Goal: *Absence of Falls  Description: Document Gaudencio Watts Fall Risk and appropriate interventions in the flowsheet. Outcome: Progressing Towards Goal  Note: Fall Risk Interventions:  Mobility Interventions: Utilize walker, cane, or other assistive device         Medication Interventions: Teach patient to arise slowly    Elimination Interventions:  Toilet paper/wipes in reach    History of Falls Interventions: Room close to nurse's station

## 2022-07-21 NOTE — BH NOTES
Pt was denied by [facility] due to having to implement a walker. Writer will follow up with other referrals.

## 2022-07-21 NOTE — PROGRESS NOTES
Patient actively participated in Spirituality Group in the [address] [place] [address]. We discussed coping skills and how each person utilizes their priya as a resource to cope, and discussed utilizing the support they have in the community.  utilized music, lyrics to favorite songs, words of encouragement and affirmation, scripture, and testimony to facilitate the group discussion and enhance group dynamics. Rev.  Jacki Mohs, [address], [address]

## 2022-07-21 NOTE — GROUP NOTE
[facility] GROUP DOCUMENTATION INDIVIDUAL                                                                          Group Therapy Note    Date: 7/21/2022    Group Start Time: 1300  Group End Time: 5500  Group Topic: Nursing    [address] Darcie Ortiz RN    IP [address] [facility] GROUP DOCUMENTATION GROUP    Group Therapy Note    Attendees: 4/5       Attendance: Attended    Patient's Goal:  To learn discharge procedure    Interventions/techniques: Informed    Follows Directions: Followed directions    Interactions: Interacted appropriately    Mental Status: Blunted    Behavior/appearance:  Withdrawn/quiet    Goals Achieved: Able to listen to others      Additional Notes:  Questions asked and answered    Chato Hook RN 55.3

## 2022-07-21 NOTE — BH NOTES
Behavioral Health Treatment Team Note     Patient goal(s) for today: Pt reports \"To get excited. \"  Treatment team focus/goals: medication management, safe discharge planning, attend groups and activities daily    Progress note: Pt was observed lying in bed. Pt was pleasant with this writer on approach. Pt endorsed some anxiety regarding some possible missing court dates due to being in inpatient. Pt continues to state that he would like to do inpatient to address substance use. Pt provided additional contact information if writer cannot  González Bruno. Pt provided information for Gloria Cesar [phone] in case Officer Samm Cuellar does not reach out in a few days. Pt was oriented x4 and denies any current SI, HI, AVH. An inpatient level of care is needed in order to stabilize the pt further on medications and to establish a safe discharge plan. LOS:  4  Expected LOS: 7-10 days    Insurance info/prescription coverage:  [place] [employer]  Date of last family contact:  N/A- Writer left another message for Officer González Bruno today (07/21). Family requesting physician contact today:  no  Discharge plan:  Possible inpatient rehab for substance use  Guns in the home:  no   Outpatient provider(s):   To be coordinated prior to discharge    Participating treatment team members: Jose Lopez, * (assigned SW), Clary Zavala, [facility]

## 2022-07-21 NOTE — PROGRESS NOTES
Discussed case interviewed patient  Less depressed less irritable  Attend some groups  Able to interact with some peers and staff  Remains sad at times and withdrawn  Positive suicidal ideations  Denies acute psychotic symptoms  Eating and sleeping better  Has some physical issues    Mental status exam  Alert oriented cooperative  Speech is goal-directed soft tone  Mood is depressed affect is restricted  Thought process linear and logical  Insight and judgment fair  Positive suicidal ideations    Recommendations  Continue inpatient treatments  Safety issues discussed  Follow-up with psychiatric team again tomorrow
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
## 2022-07-21 NOTE — BH NOTES
Treatment Team Meeting held with patient, Dr. Carlyle Gale, Rn Clinical Coordinator, Diamond Soliman, Charge RN and Candy Ward, . Patient has been very pleasant and cooperative. He has been complaint with his medications regimen and is very social with peers and staff. Patient continues to do his own dressing change with observation of the nurses. Patient has good technique as evidenced by wound not have any signs of infections. Patient is in the process of waiting on placement. He is on the waiting list for [facility] and is in the process of being reviewed by Rebeca. Patient questioning medications for His ADD. Will be started on Strattera 40mg PO every day. Will monitor effectiveness of medication. Patient continues to have issues with pain and is taking Roboxin 500mg PO TID and uses Ultram 50mg PO every 6 hours PRN which is effective. Will continue with current plan of care. Patient feels that he has met his goals and is ready for lower level of care.

## 2022-07-21 NOTE — BH NOTES
Pt in bed asleep at this time. Appears to be in no acute distress. Will continue to monitor for safety q15 minutes per unit protocol.
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
## 2022-07-22 PROCEDURE — 74011250637 HC RX REV CODE- 250/637: Performed by: INTERNAL MEDICINE

## 2022-07-22 PROCEDURE — 74011250637 HC RX REV CODE- 250/637: Performed by: PSYCHIATRY & NEUROLOGY

## 2022-07-22 PROCEDURE — 97161 PT EVAL LOW COMPLEX 20 MIN: CPT

## 2022-07-22 PROCEDURE — 65220000003 HC RM SEMIPRIVATE PSYCH

## 2022-07-22 RX ADMIN — METHOCARBAMOL 500 MG: 500 TABLET ORAL at 15:59

## 2022-07-22 RX ADMIN — SERTRALINE HYDROCHLORIDE 50 MG: 50 TABLET ORAL at 08:15

## 2022-07-22 RX ADMIN — FERROUS SULFATE TAB 325 MG (65 MG ELEMENTAL FE) 325 MG: 325 (65 FE) TAB at 08:15

## 2022-07-22 RX ADMIN — QUETIAPINE FUMARATE 150 MG: 100 TABLET ORAL at 21:01

## 2022-07-22 RX ADMIN — TRAZODONE HYDROCHLORIDE 50 MG: 50 TABLET ORAL at 21:53

## 2022-07-22 RX ADMIN — PRAZOSIN HYDROCHLORIDE 1 MG: 1 CAPSULE ORAL at 21:01

## 2022-07-22 RX ADMIN — ACETAMINOPHEN 650 MG: 325 TABLET ORAL at 07:50

## 2022-07-22 RX ADMIN — QUETIAPINE FUMARATE 50 MG: 50 TABLET ORAL at 08:15

## 2022-07-22 RX ADMIN — METHOCARBAMOL 500 MG: 500 TABLET ORAL at 21:03

## 2022-07-22 RX ADMIN — METHOCARBAMOL 500 MG: 500 TABLET ORAL at 08:15
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
## 2022-07-22 NOTE — BH NOTES
B: Pt. Alert and oriented x 3. Pt. States depression is a 3. Pt. States anxiety is 0. Pt. denies hallucinations. Denies SI/HI. No complaints with assessment. PERRLA. Speech clear. Poor dentition. Bright affect. Small delay in processing. Lungs CTA. Heart RRR. Abdomen softly distended, nontender; bs + all quads. Denies constipation. Per pt, voids yellow urine without pain or difficulty. Ambulatory with a steady gait. Peripheral pulses 2+ and equal.     I:   Administer medications as ordered and needed, Encourage pt to attend and participate in groups, encourage pt to be up for all meals and snacks, consuming all each time, encourage pt to interact with peers in a positive manner. Q 15 minute safety checks continue. R:   Compliant with medications. does attend groups, does participate. Pt. is getting up for meals, consumes 100% of meals, snacks. Pt. does interact with peers. no safety concerns at this time. P:   Pt. Will develop and continue to utilize positive coping skills. Pt will remain calm and participate appropriately with group sessions.
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
## 2022-07-22 NOTE — PROGRESS NOTES
Problem: Depressed Mood (Adult/Pediatric)  Goal: *STG: Participates in treatment plan  7/22/2022 1253 by Solitario Ferrer  Outcome: Progressing Towards Goal  7/22/2022 1252 by Solitario Ferrer  Outcome: Progressing Towards Goal     Problem: Depressed Mood (Adult/Pediatric)  Goal: *STG: Participates in treatment plan  7/22/2022 1253 by Solitario Ferrer  Outcome: Progressing Towards Goal  7/22/2022 1252 by Solitario Ferrer  Outcome: Progressing Towards Goal  Goal: *STG: Participates in 1:1 therapy sessions  Outcome: Progressing Towards Goal  Goal: *STG: Attends activities and groups  7/22/2022 1253 by Solitario Ferrer  Outcome: Progressing Towards Goal  7/22/2022 1252 by Solitario Ferrer  Outcome: Progressing Towards Goal  Goal: *STG: Demonstrates reduction in symptoms and increase in insight into coping skills/future focused  7/22/2022 1253 by Solitario Ferrer  Outcome: Progressing Towards Goal  Note: Patient currently completing assessment with [facility] for inpatient rehab for substance use.   7/22/2022 1252 by Solitario Ferrer  Note: Patient currently completing assessment with [facility] for inpatient to address substance use.

## 2022-07-22 NOTE — GROUP NOTE
MICHAEL  GROUP DOCUMENTATION INDIVIDUAL                                                                          Group Therapy Note    Date: 7/22/2022    Group Start Time: 5356  Group End Time: 1700  Group Topic: Process Group - Inpatient    [facility] [address] GROUP DOCUMENTATION GROUP    Group Therapy Note    Attendees: 6/8    Writer facilitated a processing group. Writer implemented various expressive arts activities to provide education regarding coping skills and grounding exercises. Writer encouraged patients to process any feelings they may be having and encouraged patients to discuss discharge plans, provide feedback and input. Writer concluded session with a grounding exercise and encouraging patients to provide feedback regarding the group and ask any questions they may have. Attendance: Attended    Patient's Goal:  To attend and participate in groups and activities daily. Interventions/techniques: Art integration, Validated, Promoted peer support, and Supported    Follows Directions: Followed directions    Interactions: Interacted appropriately    Mental Status: Calm, Congruent, and Elevated    Behavior/appearance: Attentive, Caretaking, Cooperative, and Motivated    Goals Achieved: Able to engage in interactions, Able to listen to others, Able to give feedback to another, Able to manage/cope with feelings, Able to receive feedback, Able to experience relief/decrease in symptoms, Able to self-disclose, Discussed coping, Discussed discharge plans, and Displayed empathy      Additional Notes:  Pt actively participated in process group. Pt was able to self-disclose regarding his family life and mental health. Pt is making progress by attending and participating in groups and activities daily.      Abrahan Rome

## 2022-07-22 NOTE — GROUP NOTE
MICHAEL  GROUP DOCUMENTATION INDIVIDUAL                                                                          Group Therapy Note    Date: 7/22/2022    Group Start Time: 0899  Group End Time: 8671  Group Topic: Education Group - Inpatient     [facility] [address] [place] [address] GROUP DOCUMENTATION GROUP    Group Therapy Note    Attendees: 6/8    Writer facilitated a psych-education group about anxiety. Writer provided education regarding the symptoms of anxiety and the cycle to include avoidance, short-term relief from anxiety, long term anxiety growth, etc. Writer encouraged patients to ask questions and provide feedback. Writer concluded group with discussing coping skills for anxiety. Attendance: Attended    Patient's Goal:  To attend and participate in groups and activities daily. Interventions/techniques: Other Psych-Ed    Follows Directions: Followed directions    Interactions: Interacted appropriately    Mental Status: Calm, Congruent, and Elevated    Behavior/appearance: Attentive, Caretaking, Cooperative, and Motivated    Goals Achieved: Able to listen to others, Able to give feedback to another, Able to reflect/comment on own behavior, Able to receive feedback, Able to self-disclose, and Discussed coping      Additional Notes:  Pt actively participated. Pt was able to discuss various phases of the anxiety cycle he struggles with such as \"running away from things. \" Pt is making progress by attending and participating in groups and activities daily.      Paty Boykin
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
## 2022-07-22 NOTE — BH NOTES
Behavioral Health Treatment Team Note     Patient goal(s) for today: Pt reports \"Do these assessments. \"  Treatment team focus/goals: medication management, safe discharge planning, attend groups and activities daily    Progress note: Pt was observed lying in bed. Pt stated that he felt better after his nap and stated that he had had \"weird\" dreams last night. Pt was oriented x4 and denies any current SI, HI, AVH. An inpatient level of care is needed in order to stabilize the pt further on medications and to establish a safe discharge plan. LOS:  5  Expected LOS: 7-10 days    Insurance info/prescription coverage:  [place] [employer]  Date of last family contact:  None as of yet. Writer left a message for Officer Allie Pinto 07/21. Family requesting physician contact today:  no  Discharge plan:  Possible inpatient rehab for substance use  Guns in the home:  no   Outpatient provider(s):   To be coordinated prior to discharge    Participating treatment team members: Sloan Skelton, * (assigned SW), Paul Self, [address]

## 2022-07-22 NOTE — BH NOTES
Writer followed up with [facility] in [facility]. Pt will need to complete phone assessment for inpatient rehab. [phone].

## 2022-07-23 PROCEDURE — 74011250637 HC RX REV CODE- 250/637: Performed by: PSYCHIATRY & NEUROLOGY

## 2022-07-23 PROCEDURE — 65220000003 HC RM SEMIPRIVATE PSYCH

## 2022-07-23 PROCEDURE — 74011250637 HC RX REV CODE- 250/637: Performed by: INTERNAL MEDICINE

## 2022-07-23 RX ADMIN — HYDROXYZINE HYDROCHLORIDE 50 MG: 50 TABLET, FILM COATED ORAL at 16:01

## 2022-07-23 RX ADMIN — ACETAMINOPHEN 650 MG: 325 TABLET ORAL at 10:31

## 2022-07-23 RX ADMIN — QUETIAPINE FUMARATE 150 MG: 100 TABLET ORAL at 21:17

## 2022-07-23 RX ADMIN — SERTRALINE HYDROCHLORIDE 50 MG: 50 TABLET ORAL at 08:25

## 2022-07-23 RX ADMIN — METHOCARBAMOL 500 MG: 500 TABLET ORAL at 08:26

## 2022-07-23 RX ADMIN — METHOCARBAMOL 500 MG: 500 TABLET ORAL at 21:17

## 2022-07-23 RX ADMIN — TRAZODONE HYDROCHLORIDE 50 MG: 50 TABLET ORAL at 21:17

## 2022-07-23 RX ADMIN — METHOCARBAMOL 500 MG: 500 TABLET ORAL at 16:01

## 2022-07-23 RX ADMIN — PRAZOSIN HYDROCHLORIDE 1 MG: 1 CAPSULE ORAL at 21:17

## 2022-07-23 RX ADMIN — ACETAMINOPHEN 650 MG: 325 TABLET ORAL at 21:18

## 2022-07-23 RX ADMIN — FERROUS SULFATE TAB 325 MG (65 MG ELEMENTAL FE) 325 MG: 325 (65 FE) TAB at 08:25

## 2022-07-23 RX ADMIN — QUETIAPINE FUMARATE 50 MG: 50 TABLET ORAL at 08:26

## 2022-07-23 NOTE — BH NOTES
B: Pt. Alert and oriented x 3. Pt. States depression is a 0. Pt. States anxiety is 0. Pt. denies hallucinations. Denies SI/HI. No complaints with assessment. PERRLA. Speech clear. Poor dentition. Bright affect. Intrusive regarding peers' situations, has to Delay in processing information. Lungs CTA. Heart RRR. Abdomen softly distended, nontender; bs + all quads. Denies constipation. Per pt, voids yellow urine without pain or difficulty. Ambulatory with a steady gait. Peripheral pulses 2+ and equal.     I:   Administer medications as ordered and needed, Encourage pt to attend and participate in groups, encourage pt to be up for all meals and snacks, consuming all each time, encourage pt to interact with peers in a positive manner. Will continue Q 15 minute safety checks per unit protocol. R:   Compliant with medications. does attend groups, does participate. Pt. is getting up for meals, consumes 100% of meals, snacks. Pt. does interact with peers. no safety concerns at this time. P:   Pt. Will develop and continue to utilize positive coping skills. Pt will remain calm and participate appropriately with group sessions.

## 2022-07-23 NOTE — BH NOTES
Patient is eating dinner in the dining room in no acute distress. Will continue to monitor for safety q15 minutes per unit protocol.

## 2022-07-23 NOTE — GROUP NOTE
IP  GROUP DOCUMENTATION INDIVIDUAL                                                                          Group Therapy Note    Date: 7/23/2022    Group Start Time: 1600  Group End Time: 2631  Group Topic: Nursing    [address] Sindhu Ortiz RN    IP [address]150 [facility] GROUP DOCUMENTATION GROUP    Group Therapy Note    Attendees: 8/8       Attendance: Attended    Patient's Goal:  To learn rules and regulations of the [facility]    Interventions/techniques: Informed    Follows Directions: Did not follow directions    Interactions: Did not interact appropriately    Mental Status: Labile and Manic    Behavior/appearance: Disheveled and Grooming impaired    Goals Achieved: Able to give feedback to another      Additional Notes:  Questions asked and answered.     Rudy Oneal RN
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
## 2022-07-23 NOTE — PROGRESS NOTES
Problem: Lower Extremity Wound Care  Goal: *Non-infected wound: Improvement of existing wound, absence of infection, and maintenance of skin integrity  Outcome: Progressing Towards Goal     Problem: Anxiety  Goal: *Alleviation of anxiety  Outcome: Progressing Towards Goal     Problem: Pain  Goal: *Control of Pain  Outcome: Progressing Towards Goal     Problem: Depressed Mood (Adult/Pediatric)  Goal: *STG: Participates in treatment plan  Outcome: Progressing Towards Goal  Goal: *STG: Attends activities and groups  Outcome: Progressing Towards Goal  Goal: *STG: Remains safe in hospital  Outcome: Progressing Towards Goal  Goal: *STG: Complies with medication therapy  Outcome: Progressing Towards Goal     Problem: Suicide  Goal: *STG: Remains safe in hospital  Outcome: Progressing Towards Goal

## 2022-07-24 PROCEDURE — 74011250637 HC RX REV CODE- 250/637: Performed by: INTERNAL MEDICINE

## 2022-07-24 PROCEDURE — 65220000003 HC RM SEMIPRIVATE PSYCH

## 2022-07-24 PROCEDURE — 74011250637 HC RX REV CODE- 250/637: Performed by: PSYCHIATRY & NEUROLOGY

## 2022-07-24 RX ORDER — OLANZAPINE 10 MG/1
10 TABLET, ORALLY DISINTEGRATING ORAL
Status: DISCONTINUED | OUTPATIENT
Start: 2022-07-24 | End: 2022-07-28 | Stop reason: HOSPADM

## 2022-07-24 RX ADMIN — FERROUS SULFATE TAB 325 MG (65 MG ELEMENTAL FE) 325 MG: 325 (65 FE) TAB at 08:23

## 2022-07-24 RX ADMIN — METHOCARBAMOL 500 MG: 500 TABLET ORAL at 08:23

## 2022-07-24 RX ADMIN — METHOCARBAMOL 500 MG: 500 TABLET ORAL at 16:08

## 2022-07-24 RX ADMIN — PRAZOSIN HYDROCHLORIDE 1 MG: 1 CAPSULE ORAL at 21:10

## 2022-07-24 RX ADMIN — SERTRALINE HYDROCHLORIDE 50 MG: 50 TABLET ORAL at 08:23

## 2022-07-24 RX ADMIN — TRAMADOL HYDROCHLORIDE 50 MG: 50 TABLET, COATED ORAL at 00:27

## 2022-07-24 RX ADMIN — OLANZAPINE 10 MG: 10 TABLET, ORALLY DISINTEGRATING ORAL at 20:04

## 2022-07-24 RX ADMIN — METHOCARBAMOL 500 MG: 500 TABLET ORAL at 21:10

## 2022-07-24 RX ADMIN — QUETIAPINE FUMARATE 50 MG: 50 TABLET ORAL at 08:23

## 2022-07-24 RX ADMIN — QUETIAPINE FUMARATE 150 MG: 100 TABLET ORAL at 21:10

## 2022-07-24 NOTE — BH NOTES
B: Pt alert and oriented x4, Pt cooperative and pleasant during assessment. Pt states depression 7/10 anxiety 10/10, denies SI/HI and AVH. Pt expressed having a \"shitty day\" because he's ready. Pt wound drsg order discontinued and only need to apply a large bandaid. Pt continues to ambulate with a cane or walker. Pt noted very and elated. Trazodone 50mg and Tylenol 650mg given. Noted asking nursing to give him certain pain meds that wasn't ordered, stated he normally uses THC and meth (via snort) to stay up. Pt continues on Q15min safety checks. I: Encourage pt to attend and participate in all groups and wrap up; Administer medication as ordered and needed. Encourage pt be up for all meals and snacks. Encourage pt to interact with staff/peers in a positive manner; Pt continues on [id number] safety checks. R: Pt consumed snack; attended and participated in wrap up. Pt interacted with his peers and staff. Pt given Trazodone 50mg po for insomnia. Pt continues on Q15min safety checks. P: Pt will develop and utilize positive coping skills. Pt will continue on Q15min safety checks. 0027: pt at nursing station requesting Tramadol and ice water. Pain rated at a 7/10 to right knee      0530: Pt slept throughout the night with no issues.

## 2022-07-24 NOTE — PROGRESS NOTES
Problem: Lower Extremity Wound Care  Goal: *Non-infected wound: Improvement of existing wound, absence of infection, and maintenance of skin integrity  Outcome: Progressing Towards Goal     Problem: Depressed Mood (Adult/Pediatric)  Goal: *STG: Participates in treatment plan  Outcome: Progressing Towards Goal  Goal: *STG: Attends activities and groups  Outcome: Progressing Towards Goal  Goal: *STG: Remains safe in hospital  Outcome: Progressing Towards Goal  Goal: *STG: Complies with medication therapy  Outcome: Progressing Towards Goal     Problem: Suicide  Goal: *STG: Remains safe in hospital  Outcome: Progressing Towards Goal

## 2022-07-24 NOTE — PROGRESS NOTES
Discuss case interviewed patient  Taking and tolerating medications well  Positive suicidal ideations  Working on his treatment a little bit better  Discussed in treatment team  No overt aggression  Irritable and easily annoyed at times  Sleep and appetite is fair  Require some as needed medication at times  Reports some anxiety symptoms    Alert and oriented fair eye contact  Speech is goal directed soft tone  Affect is labile and irritable  Positive suicidal ideations  Thought process linear and logical  Does not appear to respond to internal stimuli    Recommendations  Continue inpatient treatment  Medication regimen was reviewed  Follow-up with psychiatry again tomorrow
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
## 2022-07-24 NOTE — PROGRESS NOTES
Pt is progressing toward his goals. Denies suicidal and homicidal thoughts. Pleasant with staff and peers. States his depression and anxiety is better. Says he would like to go to rehab and is looking for housing. Attending groups and working on coping skills. Compliant with meds, safe on unit.

## 2022-07-24 NOTE — PROGRESS NOTES
Discussed case interviewed patient  Taking and tolerating medications well  Reports some irritable anxiety and anger  Also downplays methamphetamine use  Hoping to go to rehab later in the week  Mood is unstable  Affect is labile  On no suicidal ideations  Unable to pledge for safety outside the hospital    Mental status exam  Alert oriented fair eye contact  Speech is goal-directed soft tone  Affect is irritable  Thought process linear and logical  Report suicidality during the course of today  Has limited insight to substance use issues and risks    Recommendations  Continue inpatient treatment  Safety issues discussed  Follow-up with psychiatric team again tomorrow

## 2022-07-24 NOTE — BH NOTES
Pt has been out of room today attended some groups and interacted well with peers. Denies suicidal and homicidal thoughts. Dr Olga Vasquez visited this afternoon and discussed plan of care. Safe on unit will continue to monitor.

## 2022-07-25 PROCEDURE — 65220000003 HC RM SEMIPRIVATE PSYCH

## 2022-07-25 PROCEDURE — 74011250637 HC RX REV CODE- 250/637: Performed by: INTERNAL MEDICINE

## 2022-07-25 PROCEDURE — 74011250637 HC RX REV CODE- 250/637: Performed by: PSYCHIATRY & NEUROLOGY

## 2022-07-25 RX ORDER — ATOMOXETINE 40 MG/1
40 CAPSULE ORAL
Status: DISCONTINUED | OUTPATIENT
Start: 2022-07-25 | End: 2022-07-28 | Stop reason: HOSPADM

## 2022-07-25 RX ADMIN — PRAZOSIN HYDROCHLORIDE 1 MG: 1 CAPSULE ORAL at 21:06

## 2022-07-25 RX ADMIN — QUETIAPINE FUMARATE 150 MG: 100 TABLET ORAL at 21:06

## 2022-07-25 RX ADMIN — ATOMOXETINE 40 MG: 40 CAPSULE ORAL at 10:59

## 2022-07-25 RX ADMIN — FERROUS SULFATE TAB 325 MG (65 MG ELEMENTAL FE) 325 MG: 325 (65 FE) TAB at 08:25

## 2022-07-25 RX ADMIN — QUETIAPINE FUMARATE 50 MG: 50 TABLET ORAL at 08:24

## 2022-07-25 RX ADMIN — METHOCARBAMOL 500 MG: 500 TABLET ORAL at 08:24

## 2022-07-25 RX ADMIN — METHOCARBAMOL 500 MG: 500 TABLET ORAL at 21:07

## 2022-07-25 RX ADMIN — TRAMADOL HYDROCHLORIDE 50 MG: 50 TABLET, COATED ORAL at 15:38

## 2022-07-25 RX ADMIN — SERTRALINE HYDROCHLORIDE 50 MG: 50 TABLET ORAL at 08:24

## 2022-07-25 RX ADMIN — METHOCARBAMOL 500 MG: 500 TABLET ORAL at 15:38

## 2022-07-25 RX ADMIN — OLANZAPINE 10 MG: 10 TABLET, ORALLY DISINTEGRATING ORAL at 21:06
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
## 2022-07-25 NOTE — BH NOTES
Behavioral Health Treatment Team Note     Patient goal(s) for today: \"Keep going with assessments. \"  Treatment team focus/goals: medication management, safe discharge planning, attend groups and activities daily    Progress note: Pt was observed walking the unit. Pt stated that he felt his assessment with [facility] went well and that he was hoping to be accepted. Pt was oriented x4 and denies any current SI, HI, AVH. Pt stated that he felt more hopeful but had some anxiety over some possible missing court dates. An inpatient level of care is needed in order to stabilize the pt further on medications and to establish a safe discharge plan. LOS:  8  Expected LOS: 8-10 days    Insurance info/prescription coverage:  [place] Medicaid  Date of last family contact:  Writer left a message for Officer Sandee Rodriguez 07/21. Pt had assessment with [facility]. Writer will collaborate. Family requesting physician contact today:  no  Discharge plan:  Possible inpatient rehab for substance use  Guns in the home:  no   Outpatient provider(s):   To be coordinated prior to discharge    Participating treatment team members: Randy Campos, * (assigned SW), [facility], [facility]
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
## 2022-07-25 NOTE — BH NOTES
B: Pt is alert and oriented x4. Pt is cooperative with assessments. Pt reports feeling \"good\". Pt states they are waiting for somewhere to go from here. Pt identifies they're doing better mentally since their admission. Pt did not identify personal coping alternatives. No s/s of distress noted. No complaints of pain. I: Assess Pt mood. Document presence of depressive/anxiety symptoms. Assess for Audio/visual hallucinations. Establish trust.  Educate Pt on medications and disease processes. Monitor ADLs, food/fluid consumption and sleep. Encouarge group participation and socialization. Educate Pt on medications, coping alternatives, disease processes, and community resources. Safety checks. R: Pt mood is stable. Pt rates depression 0/10, identifies triggers as none. Pt rates anxiety at 0/10, identifies triggers as none. Pt denies SI. Pt denies HI. Pt denies audio/visual hallucinations, s/s are not observed by staff. Pt is easily irritable when his requests aren't met as he'd like. Pt requested 2xL scrub pants and was provided 3xl d/t being out; Pt also upset that his dressing is now a bandaid, \"that's not absorbant enough\". Pt informed if drainage soaks through, another can be applied. No drainage observed when bandaid applied, cleaned with saline. Pt is not attending groups and is not interacting appropriately with staff/peers. Pt is eating all meals, and is maintaining their personal hygiene. Pt reports sleeping well. Pt is compliant wiith medications. P: Encourage group participation and socialization.

## 2022-07-25 NOTE — BH NOTES
Collateral call: Writer spoke with admissions for [facility]. Pt is able to be assessed today at 1:30.  will call the nurse's station.

## 2022-07-25 NOTE — GROUP NOTE
[facility] GROUP DOCUMENTATION INDIVIDUAL                                                                          Group Therapy Note    Date: 7/25/2022    Group Start Time: 1824  Group End Time: 1700  Group Topic: Process Group - Inpatient    [facility] [address]    IP 1150 [facility] GROUP DOCUMENTATION GROUP    Group Therapy Note    Attendees: 6/8    Writer facilitated a processing group combo psychoeducation group. Writer encouraged patients to engage in mindfulness/expressive arts activity while processing any feelings they may have regarding discharge planning, coping, etc. Writer processed with patients regarding CBT exercises titled \"Negative Thinking Patterns. \" Writer reviewed various concepts such as \"Mind reading, minimizing, blaming, etc.\" Writer encouraged patients to provide examples and process any feelings they may have. Writer concluded group with a grounding exercise and encouraging patients to ask questions regarding the group and provide feedback and support to one another. Attendance: Attended    Patient's Goal:  To attend and participate in groups and activities daily     Interventions/techniques: Informed, Validated, Promoted peer support, and Supported    Follows Directions: Followed directions    Interactions: Interacted appropriately    Mental Status: Congruent and Elevated    Behavior/appearance: Attentive, Caretaking, Cooperative, and Motivated    Goals Achieved: Able to engage in interactions, Able to listen to others, Able to give feedback to another, Able to manage/cope with feelings, Able to experience relief/decrease in symptoms, Discussed coping, and Discussed discharge plans      Additional Notes:  Pt actively participated. Pt was able to discuss discharge plans and discuss his feelings regarding having an assessment for substance use. Pt was able to identify thinking errors. Pt is making progress by attending and participating in groups and activities daily. Jessy Scottr

## 2022-07-25 NOTE — PROGRESS NOTES
Nutrition Assessment     Type and Reason for Visit: Initial, Consult (double portion)    Nutrition Recommendations/Plan:   Continue w/ current diet/schedule snacks   Add side salad to L/D      Nutrition Assessment:  Admitted in [address] [place] [address] for double portion. Patient reported that he is still hungry after meals 2/2 recently started on new meds that increase his appetite. He has a good po intake >75% @ meals/snacks. Explained that he does not qualified for double portion due to good po intakes and BMI of 34. However 2/2 to still hungry at meals, will add side salad to meals for L/D. Patient understood and happy with adding a side salad to meals. No further nutritional concern. Malnutrition Assessment:  Malnutrition Status: No malnutrition       Nutrition Related Findings:  Nourished, Obese.     Current Nutrition Therapies:  ADULT DIET Regular  ADULT ORAL NUTRITION SUPPLEMENT AM Snack, PM Snack, HS Snack; Standard 4 oz      Nutrition Diagnosis:   No nutrition diagnosis at this time   Nutrition Interventions:   Food and/or Nutrient Delivery: Modify current diet  Nutrition Education/Counseling: Education not indicated  Coordination of Nutrition Care: No recommendation at this time  Plan of Care discussed with: Patient, RN      Discharge Planning:    Continue current diet    Ciro Bear  Contact: [phone] or Tanvi Grover

## 2022-07-25 NOTE — PROGRESS NOTES
Problem: Anxiety  Goal: *Alleviation of anxiety  Outcome: Progressing Towards Goal     Problem: Depressed Mood (Adult/Pediatric)  Goal: *STG: Participates in treatment plan  Outcome: Progressing Towards Goal  Goal: *STG: Verbalizes anger, guilt, and other feelings in a constructive manor  Outcome: Not Progressing Towards Goal  Goal: *STG: Attends activities and groups  Outcome: Progressing Towards Goal  Goal: *STG: Remains safe in hospital  Outcome: Progressing Towards Goal  Goal: *STG: Complies with medication therapy  Outcome: Progressing Towards Goal     Problem: Suicide  Goal: *STG: Remains safe in hospital  Outcome: Progressing Towards Goal

## 2022-07-25 NOTE — BH NOTES
Pt upset, yelling in hallway. Demanding to speak with [place] Ill. Demanding a \"optiform\". Pt refusing to calm down. Pt has been offered a bandaid, but continues to request the optiform. Despite being informed the order for optiform has been discontinued. Dr. Ines Fitzgerald notified, suggested to give PO zyprexa. Dr. Ines Fitzgerald informed Pt has refused the PO zyprexa, declined to order IM. Pt has been yelling in hallway for 20 minutes. Dr. Ines Fitzgerald aware.

## 2022-07-25 NOTE — PROGRESS NOTES
Problem: Depressed Mood (Adult/Pediatric)  Goal: *STG: Participates in treatment plan  Outcome: Progressing Towards Goal  Note: Pt  completed assessment for [facility] for inpatient substance use.    Goal: *STG: Participates in 1:1 therapy sessions  Outcome: Progressing Towards Goal  Goal: *STG: Attends activities and groups  Outcome: Progressing Towards Goal

## 2022-07-26 PROCEDURE — 65220000003 HC RM SEMIPRIVATE PSYCH

## 2022-07-26 PROCEDURE — 74011250637 HC RX REV CODE- 250/637: Performed by: INTERNAL MEDICINE

## 2022-07-26 PROCEDURE — 74011250637 HC RX REV CODE- 250/637: Performed by: PSYCHIATRY & NEUROLOGY

## 2022-07-26 RX ADMIN — METHOCARBAMOL 500 MG: 500 TABLET ORAL at 21:00

## 2022-07-26 RX ADMIN — PRAZOSIN HYDROCHLORIDE 1 MG: 1 CAPSULE ORAL at 20:44

## 2022-07-26 RX ADMIN — FERROUS SULFATE TAB 325 MG (65 MG ELEMENTAL FE) 325 MG: 325 (65 FE) TAB at 08:36

## 2022-07-26 RX ADMIN — METHOCARBAMOL 500 MG: 500 TABLET ORAL at 17:05

## 2022-07-26 RX ADMIN — QUETIAPINE FUMARATE 150 MG: 100 TABLET ORAL at 20:44

## 2022-07-26 RX ADMIN — METHOCARBAMOL 500 MG: 500 TABLET ORAL at 08:35

## 2022-07-26 RX ADMIN — OLANZAPINE 10 MG: 10 TABLET, ORALLY DISINTEGRATING ORAL at 20:45

## 2022-07-26 RX ADMIN — SERTRALINE HYDROCHLORIDE 50 MG: 50 TABLET ORAL at 08:35

## 2022-07-26 RX ADMIN — HYDROXYZINE HYDROCHLORIDE 50 MG: 50 TABLET, FILM COATED ORAL at 18:45

## 2022-07-26 RX ADMIN — ATOMOXETINE 40 MG: 40 CAPSULE ORAL at 06:53

## 2022-07-26 RX ADMIN — QUETIAPINE FUMARATE 50 MG: 50 TABLET ORAL at 09:00
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
## 2022-07-26 NOTE — BH NOTES
Pt. Requested to have wound care products to clean and cover wound. Writer observed pt. Removing covering with small amount of sanguineous drainage on bandage and bright red sanguineous drainage draining from wound. Upon palpation of wound on right inner thigh, karyna wound edges rolled, undefined, hypergranulation,with serous drainage upon palpation of edges. Pt. Asked writer to clean wound an assess it. Writer cleansed wound with wound cleanser, irrigated with saline, and assessed for tunneling and undermining with a clean cotton tipped applicator. Area has 0.5cm tunneling at 12 0\"clock, undermining of 0.2 at widest point from 7 to 11. Writer notified charge nurse which had left a message with hospitalist, to come evaluate with no return call this am.  Upon Charge nurse assessing, with pt. Having cared for wound prior to and during admission, pt. Requested since the wound was not closed to please pack with iodoform as prior until the dr. Could come assess. Charge nurse agreed and writer packed wound with 1/2\" iodoform wet with saline to edges of tunneling and undermining. Pt. Denies pain, states he cannot feel anything. Writer covered wound with a pressure 2x2 dry gauze and secured with paper tape until able to be seen by hospitalist.  Pt. In agreement.
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
## 2022-07-26 NOTE — PROGRESS NOTES
Problem: Falls - Risk of  Goal: *Absence of Falls  Description: Document Bibi Louis Fall Risk and appropriate interventions in the flowsheet. Outcome: Progressing Towards Goal  Note: Fall Risk Interventions:  Mobility Interventions: Utilize walker, cane, or other assistive device    Mentation Interventions: Room close to nurse's station, Door open when patient unattended    Medication Interventions: Teach patient to arise slowly    Elimination Interventions:  Toilet paper/wipes in reach    History of Falls Interventions: Door open when patient unattended

## 2022-07-26 NOTE — BH NOTES
Spoke with dietary to request a side salad that patient is supposed to receive with every lunch and every dinner. Notified dietary that patient is awaiting a side salad.
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
## 2022-07-26 NOTE — BH NOTES
Writer received call from Dr. Frankey Minors, hospitalist at approx. 1045, writer informed him of pt's current order, assessment from this am, pt. Request of packing again, and had been completed with Charge nurse and writer. Dr. Frankey Minors stated, to order dressing change as upon admission, and he will come evaluate pt.'s wound today. Pt. Made aware.

## 2022-07-26 NOTE — PROGRESS NOTES
Pt is progressing toward his goals. Denies suicidal and homicidal thoughts. Demanding of staff, states he wants to be discharged and sent to rehab. Will not except redirection and is loud agitated with staff most of the time. Compliant with meds, eating and sleeping well. Safe on unit.

## 2022-07-26 NOTE — GROUP NOTE
MICHAEL  GROUP DOCUMENTATION INDIVIDUAL                                                                          Group Therapy Note    Date: 7/26/2022    Group Start Time: 7795  Group End Time: 1500  Group Topic: Process Group - Inpatient    SVR Renae Murray    IP [address] GROUP DOCUMENTATION GROUP    Group Therapy Note    Attendees: 2/7    Writer facilitated a combination process and reflection/relaxation group. Writer encouraged patients to process any feelings they may be having and to discuss discharge plans. Writer processed with patients regarding follow up services and self-care. Writer implemented a reflection/relaxation activity implementing paint and expressive arts therapy. Writer encouraged patients to engage in the mindfulness activity. Writer concluded session with a grounding exercise and encouraging patients to provide input and feedback regarding the group. Attendance: Did not attend      Additional Notes:  Pt was invited and encouraged to attend group but chose not to attend.      Reece Hensley

## 2022-07-26 NOTE — BH NOTES
B: Pt. Alert and oriented x 3. Pt. States depression is a 4. Pt. States anxiety is 4. Pt. denies hallucinations. Denies SI/HI. No complaints with assessment. PERRLA. Speech clear. Irritable. Lungs CTA, heart RRR. Abdomen softly distended, nontender; bs + all quads. Denies constipation. Per pt, voids yellow urine without pain or difficulty. Ambulatory with a steady gait. BUE pulses 2+ and equal.  BLE pulses 1+ and equal bilaterally. I:   Administer medications as ordered and needed, Encourage pt to attend and participate in groups, encourage pt to be up for all meals and snacks, consuming all each time, encourage pt to interact with peers in a positive manner. Q 15 minute safety checks continue. R:   Compliant with medications. does attend groups, does participate. Pt. is getting up for meals, consumes 75% of meals, snacks. Pt. does interact with peers. no safety concerns at this time. P:   Pt. Will develop and continue to utilize positive coping skills. Will attend and participate in group sessions.

## 2022-07-26 NOTE — BH NOTES
B: Pt is out and about on unit this evening. Denies suicidal and homicidal thoughts. States he is ready for discharge and wants to be sent to rehab. Demanding and loud with staff and difficult to redirect. Constantly comes to nurses station asking questions and wanting items. Compliant with meds, eating and sleeping well. Attending and participating in groups. Safe on unit. I: Maintain a safe environment for pt, safety cks q 15 mins and prn. Give meds as ordered, encourage groups. R: Pt is cooperative with assessment, although he made clear to staff that he did not want to be here. Denies suicidal and homicidal thoughts. Encouraged to use his coping skills to help with anger issues. Safe on unit. P: Continue to monitor, give meds as ordered, encourage groups.

## 2022-07-27 PROCEDURE — 65220000003 HC RM SEMIPRIVATE PSYCH

## 2022-07-27 PROCEDURE — 74011250637 HC RX REV CODE- 250/637: Performed by: INTERNAL MEDICINE

## 2022-07-27 PROCEDURE — 74011250637 HC RX REV CODE- 250/637: Performed by: PSYCHIATRY & NEUROLOGY

## 2022-07-27 RX ADMIN — HYDROXYZINE HYDROCHLORIDE 50 MG: 50 TABLET, FILM COATED ORAL at 18:34

## 2022-07-27 RX ADMIN — METHOCARBAMOL 500 MG: 500 TABLET ORAL at 16:49

## 2022-07-27 RX ADMIN — PRAZOSIN HYDROCHLORIDE 1 MG: 1 CAPSULE ORAL at 21:08

## 2022-07-27 RX ADMIN — QUETIAPINE FUMARATE 50 MG: 50 TABLET ORAL at 08:32

## 2022-07-27 RX ADMIN — FERROUS SULFATE TAB 325 MG (65 MG ELEMENTAL FE) 325 MG: 325 (65 FE) TAB at 08:32

## 2022-07-27 RX ADMIN — METHOCARBAMOL 500 MG: 500 TABLET ORAL at 08:32

## 2022-07-27 RX ADMIN — METHOCARBAMOL 500 MG: 500 TABLET ORAL at 21:09

## 2022-07-27 RX ADMIN — ACETAMINOPHEN 650 MG: 325 TABLET ORAL at 18:34

## 2022-07-27 RX ADMIN — QUETIAPINE FUMARATE 150 MG: 100 TABLET ORAL at 21:08

## 2022-07-27 RX ADMIN — TRAMADOL HYDROCHLORIDE 50 MG: 50 TABLET, COATED ORAL at 21:09

## 2022-07-27 RX ADMIN — ATOMOXETINE 40 MG: 40 CAPSULE ORAL at 06:42

## 2022-07-27 RX ADMIN — OLANZAPINE 10 MG: 10 TABLET, ORALLY DISINTEGRATING ORAL at 08:32

## 2022-07-27 RX ADMIN — SERTRALINE HYDROCHLORIDE 50 MG: 50 TABLET ORAL at 08:32
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
## 2022-07-27 NOTE — BH NOTES
B: Pt. Alert and oriented x 3. Pt. States depression is a 0. Pt. States anxiety is 0. Pt. denies hallucinations. Denies SI/HI. No complaints with assessment. PERRLA. Speech clear. Irritable, labile. Lungs CTA, heart RRR. Abdomen softly distended, nontender; bs + all quads. Denies constipation. Per pt, voids yellow urine without pain or difficulty. Ambulatory with a steady gait. BUE pulses 2+ and equal.  BLE pulses 1+ and equal bilaterally. I:   Administer medications as ordered and needed, Encourage pt to attend and participate in groups, encourage pt to be up for all meals and snacks, consuming all each time, encourage pt to interact with peers in a positive manner. Will continue Q15 minute safety checks. R:   Compliant with medications. does attend groups, does participate. Pt. is getting up for meals, consumes 100% of meals, snacks. Pt. does interact with peers. no safety concerns at this time. P:   Pt. Will develop and continue to utilize positive coping skills. Will attend and participate in group sessions. Will interact socially with respect.

## 2022-07-27 NOTE — BH NOTES
Arrangements made for patient to be discharged to [facility] at [address] [address]. Ride Confirmation # [id number] scheduled to  patient at 0800 on 7/28/22. If there are any issues with his transportation, Medicaid [place] can be reached at [phone].

## 2022-07-27 NOTE — PROGRESS NOTES
Patient actively participated in Spirituality Group in the [address] [place] [address] unit.  utilized music, lyrics to favorite songs, words of encouragement, affirmation, scripture, and music to facilitate the group discussion and enhance group dynamics. Rev.  Gianni Post 49, [address]

## 2022-07-28 VITALS
HEIGHT: 74 IN | SYSTOLIC BLOOD PRESSURE: 109 MMHG | HEART RATE: 53 BPM | WEIGHT: 270 LBS | OXYGEN SATURATION: 99 % | RESPIRATION RATE: 18 BRPM | BODY MASS INDEX: 34.65 KG/M2 | DIASTOLIC BLOOD PRESSURE: 58 MMHG | TEMPERATURE: 98.3 F

## 2022-07-28 PROBLEM — R52 PAIN: Status: ACTIVE | Noted: 2022-07-28

## 2022-07-28 PROCEDURE — 74011250637 HC RX REV CODE- 250/637: Performed by: INTERNAL MEDICINE

## 2022-07-28 PROCEDURE — 74011250637 HC RX REV CODE- 250/637: Performed by: PSYCHIATRY & NEUROLOGY

## 2022-07-28 RX ORDER — PRAZOSIN HYDROCHLORIDE 1 MG/1
1 CAPSULE ORAL
Qty: 30 CAPSULE | Refills: 1 | Status: SHIPPED | OUTPATIENT
Start: 2022-07-28 | End: 2022-08-27

## 2022-07-28 RX ORDER — METHOCARBAMOL 500 MG/1
500 TABLET, FILM COATED ORAL 3 TIMES DAILY
Qty: 45 TABLET | Refills: 0 | Status: SHIPPED | OUTPATIENT
Start: 2022-07-28 | End: 2022-08-12

## 2022-07-28 RX ORDER — TRAMADOL HYDROCHLORIDE 50 MG/1
50 TABLET ORAL
Qty: 20 TABLET | Refills: 0 | Status: SHIPPED | OUTPATIENT
Start: 2022-07-28 | End: 2022-08-04

## 2022-07-28 RX ORDER — LANOLIN ALCOHOL/MO/W.PET/CERES
325 CREAM (GRAM) TOPICAL
Qty: 30 TABLET | Refills: 0 | Status: SHIPPED | OUTPATIENT
Start: 2022-07-28 | End: 2022-08-27

## 2022-07-28 RX ORDER — QUETIAPINE FUMARATE 50 MG/1
150 TABLET, FILM COATED ORAL
Qty: 90 TABLET | Refills: 1 | Status: SHIPPED | OUTPATIENT
Start: 2022-07-28 | End: 2022-08-27

## 2022-07-28 RX ORDER — TRAZODONE HYDROCHLORIDE 50 MG/1
50 TABLET ORAL
Qty: 30 TABLET | Refills: 0 | Status: SHIPPED | OUTPATIENT
Start: 2022-07-28 | End: 2022-08-27

## 2022-07-28 RX ORDER — QUETIAPINE FUMARATE 50 MG/1
50 TABLET, FILM COATED ORAL DAILY
Qty: 30 TABLET | Refills: 1 | Status: SHIPPED | OUTPATIENT
Start: 2022-07-28 | End: 2022-08-27

## 2022-07-28 RX ORDER — ACETAMINOPHEN 325 MG/1
650 TABLET ORAL
Qty: 30 TABLET | Refills: 0 | Status: SHIPPED | OUTPATIENT
Start: 2022-07-28 | End: 2022-08-12

## 2022-07-28 RX ORDER — SERTRALINE HYDROCHLORIDE 50 MG/1
50 TABLET, FILM COATED ORAL DAILY
Qty: 30 TABLET | Refills: 1 | Status: SHIPPED | OUTPATIENT
Start: 2022-07-28 | End: 2022-08-27

## 2022-07-28 RX ADMIN — HYDROXYZINE HYDROCHLORIDE 50 MG: 50 TABLET, FILM COATED ORAL at 09:24

## 2022-07-28 RX ADMIN — ATOMOXETINE 40 MG: 40 CAPSULE ORAL at 07:20

## 2022-07-28 RX ADMIN — ACETAMINOPHEN 650 MG: 325 TABLET ORAL at 05:53

## 2022-07-28 RX ADMIN — QUETIAPINE FUMARATE 50 MG: 50 TABLET ORAL at 07:59

## 2022-07-28 RX ADMIN — FERROUS SULFATE TAB 325 MG (65 MG ELEMENTAL FE) 325 MG: 325 (65 FE) TAB at 07:55

## 2022-07-28 RX ADMIN — METHOCARBAMOL 500 MG: 500 TABLET ORAL at 08:00

## 2022-07-28 RX ADMIN — SERTRALINE HYDROCHLORIDE 50 MG: 50 TABLET ORAL at 08:00

## 2022-07-28 NOTE — BH NOTES
DISCHARGE SUMMARY    NAME:Steffany Holloway  : 1990  MRN: [id number]    The patient Doron Castro exhibits the ability to control behavior in a less restrictive environment. Patient's level of functioning is improving. No assaultive/destructive behavior has been observed for the past 24 hours. No suicidal/homicidal threat or behavior has been observed for the past 24 hours. There is no evidence of serious medication side effects. Patient has not been in physical or protective restraints for at least the past 24 hours. If weapons involved, how are they secured? N/A    Is patient aware of and in agreement with discharge plan? Yes    Arrangements for medication:  Prescriptions given to patient, given a weeks supply or 30 day supply. Copy of discharge instructions to provider?:  Yes    Arrangements for transportation home:  Pt will be picked up via Medicaid cab to [facility] crisis while waiting [address] [facility]. Keep all follow up appointments as scheduled, continue to take prescribed medications per physician instructions.   Mental health crisis number:  852 or your local mental health crisis line number at [address] [facility] WARM LINE      9-171-458-MHAV ()      M-F: 9am to 9pm      Sat & Sun: 5pm - 9pm  National suicide prevention lines:                             3-998-GMDWGXX ([phone])       8-272-789-TALK ([phone])    Crisis Text Line:  Text HOME to 695153

## 2022-07-28 NOTE — BH NOTES
Discharge instructions given and explained. States understanding. Signed D/C paperwork. Belongings returned and signed for. Instructed prescriptions can be picked up at [facility] in [place] on [address]. Denies SI/HI. Non Smoker, No education needed. No safety concerns at this time. Transported to [address] [place] [address] via [facility] transport. Pt has walker with him.

## 2022-07-28 NOTE — PROGRESS NOTES
Problem: Falls - Risk of  Goal: *Absence of Falls  Description: Document Gerry Anderson Fall Risk and appropriate interventions in the flowsheet. Outcome: Progressing Towards Goal  Note: Fall Risk Interventions:  Mobility Interventions: Utilize walker, cane, or other assistive device    Mentation Interventions: Room close to nurse's station, Door open when patient unattended    Medication Interventions: Teach patient to arise slowly    Elimination Interventions:  Toilet paper/wipes in reach    History of Falls Interventions: Door open when patient unattended, Room close to nurse's station         Problem: Lower Extremity Wound Care  Goal: *Non-infected wound: Improvement of existing wound, absence of infection, and maintenance of skin integrity  Outcome: Progressing Towards Goal     Problem: Anxiety  Goal: *Alleviation of anxiety  Outcome: Progressing Towards Goal     Problem: Depressed Mood (Adult/Pediatric)  Goal: *STG: Participates in treatment plan  Outcome: Progressing Towards Goal  Goal: *STG: Attends activities and groups  Outcome: Progressing Towards Goal  Goal: *STG: Remains safe in hospital  Outcome: Progressing Towards Goal  Goal: *STG: Complies with medication therapy  Outcome: Progressing Towards Goal     Problem: Suicide  Goal: *STG: Remains safe in hospital  Outcome: Progressing Towards Goal

## 2022-07-28 NOTE — BH NOTES
Pt. Discharged home via taxi in stable cond. Pt. Voiced no thoughts of wanting to harm self or others.

## 2022-07-28 NOTE — BH NOTES
[employer] called to check status of transport. Informed that transport was cancelled due to not being able to clarify destination. Reordered via [place] of transportation. Scheduled to  before 11:26.  Trip #[id number]

## 2022-07-28 NOTE — BH NOTES
B: Pt alert and oriented x4, Pt cooperative and pleasant during assessment. C/O right knee pain 6/10 and Tramadol 50mg given po given. Pt wound drsg to right thigh intact. Pt remains intrusive and loud. Request information for the [facility] in [place] that donates clothes and other items. Sticky with their email to send message to setup appt to visit on Friday. Pt continues to ambulate with a cane or walker. Pt continues on Q15min safety checks. I: Encourage pt to attend and participate in all groups and wrap up; Administer medication as ordered and needed. Encourage pt be up for all meals and snacks. Encourage pt to interact with staff/peers in a positive manner; Pt continues on T90sbew safety checks. R: Pt consumed snack; attended and participated in wrap up. Pt interacted with his peers and staff. Pt given Tramadol 50mg po for right thigh pain 6/10. Pt continues on Q15min safety checks. P: Pt will develop and utilize positive coping skills. Pt will continue on Q15min safety checks. 0530: Pt slept throughout the night with no issues.

## 2022-07-28 NOTE — BH NOTES
B:  Received awake sitting in dayroom watching TV. Pt. Alert and oriented x 4. Pt. States depression is a 0. Pt. States anxiety is 0. Pt. denies hallucinations. Denies SI/HI. Cooperative with assessment. States goal for today is to stay calm so that he can go home. Feeling anxious but happy about leaving. Pacing hallway constantly from nursing station to dayroom. Self cleaned wound and packed with Idofoam. Optifoam dressing applied. Procedure performed well. De     I:   Administer medications as ordered and needed, Encourage pt to attend and participate in groups, encourage pt to be up for all meals and snacks, consuming all each time, encourage pt to interact with peers in a positive manner. Q 15 minute safety checks continue. R:   Compliant  with medications. does attend groups, does participate. Pt. is getting up for meals, consumes 100% of meals, snacks. Pt. does interact with peers. no safety concerns at this time. P:   Pt. Will develop and continue to utilize positive coping skills. Will continue with Plan of Care as ordered. Will continue to interact with peers and staff appropriately during stay.

## 2022-07-28 NOTE — BH NOTES
Pt discharged this morning. No SI/HI noted prior to discharge. Staff escorted pt to cab, pt maintained steady gait.

## 2022-07-28 NOTE — BH NOTES
Behavioral Health Transition Record to Provider    Patient Name: Simone Batres  YOB: 1990  Medical Record Number: [id number]  Date of Admission: 7/17/2022  Date of Discharge: 07/28/2022    Attending Provider: Wesley Patel MD  Discharging Provider: Dr. Lizette Cantor  To contact this individual call [phone] and ask the  to page. If unavailable, ask to be transferred to [address] [place] [address] Provider on call. [facility] Provider will be available on call 24/7 and during holidays. Primary Care Provider: None    No Known Allergies    Reason for Admission: Pt was admitted due to SI and symptoms related to PTSD. Admission Diagnosis: PTSD (post-traumatic stress disorder) [F43.10]  Suicidal thoughts [R45.851]  Depression [F32. A]  Anxiety [F41.9]  Self-referral [Z02.89]    * No surgery found *    Results for orders placed or performed during the hospital encounter of 07/17/22   HEMOGLOBIN A1C WITH EAG   Result Value Ref Range    Hemoglobin A1c 4.7 4.0 - 5.6 %    Est. average glucose 88 mg/dL       Immunizations administered during this encounter: There is no immunization history on file for this patient. Screening for Metabolic Disorders for Patients on Antipsychotic Medications  (Data obtained from the EMR)    Estimated Body Mass Index  Estimated body mass index is 34.67 kg/m² as calculated from the following:    Height as of this encounter: 6' 2\" (1.88 m). Weight as of this encounter: 122.5 kg (270 lb).      Vital Signs/Blood Pressure  Visit Vitals  BP (!) 109/58   Pulse (!) 53   Temp 98.3 °F (36.8 °C)   Resp 18   Ht 6' 2\" (1.88 m)   Wt 122.5 kg (270 lb)   SpO2 99%   BMI 34.67 kg/m²       Blood Glucose/Hemoglobin A1c  Lab Results   Component Value Date/Time    Glucose 99 07/15/2022 11:45 PM       Lab Results   Component Value Date/Time    Hemoglobin A1c 4.7 07/18/2022 06:35 AM        Lipid Panel  No results found for: CHOL, CHOLX, CHLST, CHOLV, 504843, HDL, HDLP, LDL, LDLC, Lito Showers, TRIGL, [address], [place], [address]     Discharge Diagnosis: PTSD    Discharge Plan: Pt will be discharged to crisis and then to [address]    Discharge Medication List and Instructions:   Current Discharge Medication List        START taking these medications    Details   sertraline (ZOLOFT) 50 mg tablet Take 1 Tablet by mouth in the morning for 30 days. Qty: 30 Tablet, Refills: 1  Start date: 7/28/2022, End date: 8/27/2022      methocarbamoL (ROBAXIN) 500 mg tablet Take 1 Tablet by mouth three (3) times daily for 15 days. Qty: 45 Tablet, Refills: 0  Start date: 7/28/2022, End date: 8/12/2022      acetaminophen (TYLENOL) 325 mg tablet Take 2 Tablets by mouth every four (4) hours as needed for Pain for up to 15 days. Qty: 30 Tablet, Refills: 0  Start date: 7/28/2022, End date: 8/12/2022      !! ferrous sulfate 325 mg (65 mg iron) tablet Take 1 Tablet by mouth daily (with breakfast) for 30 days. Qty: 30 Tablet, Refills: 0  Start date: 7/28/2022, End date: 8/27/2022      traZODone (DESYREL) 50 mg tablet Take 1 Tablet by mouth nightly as needed for Sleep (For insomnia) for up to 30 days. Qty: 30 Tablet, Refills: 0  Start date: 7/28/2022, End date: 8/27/2022       !! - Potential duplicate medications found. Please discuss with provider. CONTINUE these medications which have CHANGED    Details   prazosin (MINIPRESS) 1 mg capsule Take 1 Capsule by mouth nightly for 30 days. Qty: 30 Capsule, Refills: 1  Start date: 7/28/2022, End date: 8/27/2022      !! QUEtiapine (SEROquel) 50 mg tablet Take 1 Tablet by mouth in the morning for 30 days. Indications: bipolar depression  Qty: 30 Tablet, Refills: 1  Start date: 7/28/2022, End date: 8/27/2022      !! QUEtiapine (SEROquel) 50 mg tablet Take 3 Tablets by mouth nightly for 30 days.  Indications: bipolar depression  Qty: 90 Tablet, Refills: 1  Start date: 7/28/2022, End date: 8/27/2022      traMADoL (ULTRAM) 50 mg tablet Take 1 Tablet by mouth every six (6) hours as needed for Pain for up to 7 days. Max Daily Amount: 200 mg. Qty: 20 Tablet, Refills: 0  Start date: 7/28/2022, End date: 8/4/2022    Associated Diagnoses: Pain; Anxiety; Self-referral; Suicidal thoughts; PTSD (post-traumatic stress disorder); Current mild episode of major depressive disorder without prior episode [facility])       ! ! - Potential duplicate medications found. Please discuss with provider. CONTINUE these medications which have NOT CHANGED    Details   !! ferrous sulfate 325 mg (65 mg iron) tablet Take 325 mg by mouth Daily (before breakfast). !! - Potential duplicate medications found. Please discuss with provider. STOP taking these medications       escitalopram oxalate (Lexapro) 10 mg tablet Comments:   Reason for Stopping:         QUEtiapine SR (SEROquel XR) 150 mg sr tablet Comments:   Reason for Stopping:               Unresulted Labs (24h ago, onward)      None          To obtain results of studies pending at discharge, please contact [phone]    Follow-up Information       Follow up With Specialties Details [facility]  Follow up  Fax hospital d/c to [phone]    None    None (395) Patient stated that they have no PCP      [employer] and Recovery  Call  Call to follow up on bed placement. Call [phone] opt 2    Fax hospital discharge to [phone]            Advanced Directive:   Does the patient have an appointed surrogate decision maker? No  Does the patient have a Medical Advance Directive? No  Does the patient have a Psychiatric Advance Directive? No  If the patient does not have a surrogate or Medical Advance Directive AND Psychiatric Advance Directive, the patient was offered information on these advance directives Patient will complete at a later time    Patient Instructions: Please continue all medications until otherwise directed by physician.       Tobacco Cessation Discharge Plan:   Is the patient a smoker and needs referral for smoking cessation? No  Patient referred to the following for smoking cessation with an appointment? No     Patient was offered medication to assist with smoking cessation at discharge? No  Was education for smoking cessation added to the discharge instructions? No    Alcohol/Substance Abuse Discharge Plan:   Does the patient have a history of substance/alcohol abuse and requires a referral for treatment? Yes  Patient referred to the following for substance/alcohol abuse treatment with an appointment? Yes  Patient was offered medication to assist with alcohol cessation at discharge? No  Was education for substance/alcohol abuse added to discharge instructions?  Yes    Patient discharged to Inpatient facility; discussed with the receiving facility  plan for follow-up care